# Patient Record
Sex: FEMALE | Race: WHITE | Employment: OTHER | URBAN - METROPOLITAN AREA
[De-identification: names, ages, dates, MRNs, and addresses within clinical notes are randomized per-mention and may not be internally consistent; named-entity substitution may affect disease eponyms.]

---

## 2021-03-04 ENCOUNTER — APPOINTMENT (OUTPATIENT)
Dept: CT IMAGING | Age: 71
End: 2021-03-04
Attending: EMERGENCY MEDICINE
Payer: MEDICARE

## 2021-03-04 ENCOUNTER — HOSPITAL ENCOUNTER (EMERGENCY)
Age: 71
Discharge: HOME | End: 2021-03-05
Attending: EMERGENCY MEDICINE
Payer: MEDICARE

## 2021-03-04 ENCOUNTER — APPOINTMENT (OUTPATIENT)
Dept: GENERAL RADIOLOGY | Age: 71
End: 2021-03-04
Attending: EMERGENCY MEDICINE
Payer: MEDICARE

## 2021-03-04 DIAGNOSIS — S42.292A HUMERAL HEAD FRACTURE, LEFT, CLOSED, INITIAL ENCOUNTER: Primary | ICD-10-CM

## 2021-03-04 PROCEDURE — 74011250636 HC RX REV CODE- 250/636: Performed by: EMERGENCY MEDICINE

## 2021-03-04 PROCEDURE — 99284 EMERGENCY DEPT VISIT MOD MDM: CPT

## 2021-03-04 PROCEDURE — 73090 X-RAY EXAM OF FOREARM: CPT

## 2021-03-04 PROCEDURE — 74011250637 HC RX REV CODE- 250/637: Performed by: EMERGENCY MEDICINE

## 2021-03-04 PROCEDURE — 96372 THER/PROPH/DIAG INJ SC/IM: CPT

## 2021-03-04 PROCEDURE — 73060 X-RAY EXAM OF HUMERUS: CPT

## 2021-03-04 PROCEDURE — 75810000053 HC SPLINT APPLICATION

## 2021-03-04 PROCEDURE — 73200 CT UPPER EXTREMITY W/O DYE: CPT

## 2021-03-04 RX ORDER — KETOROLAC TROMETHAMINE 15 MG/ML
15 INJECTION, SOLUTION INTRAMUSCULAR; INTRAVENOUS ONCE
Status: COMPLETED | OUTPATIENT
Start: 2021-03-04 | End: 2021-03-04

## 2021-03-04 RX ORDER — HYDROCODONE BITARTRATE AND ACETAMINOPHEN 5; 325 MG/1; MG/1
2 TABLET ORAL
Status: COMPLETED | OUTPATIENT
Start: 2021-03-04 | End: 2021-03-04

## 2021-03-04 RX ORDER — ONDANSETRON 4 MG/1
4 TABLET, ORALLY DISINTEGRATING ORAL
Status: COMPLETED | OUTPATIENT
Start: 2021-03-04 | End: 2021-03-04

## 2021-03-04 RX ORDER — MORPHINE SULFATE 4 MG/ML
4 INJECTION INTRAVENOUS ONCE
Status: COMPLETED | OUTPATIENT
Start: 2021-03-04 | End: 2021-03-04

## 2021-03-04 RX ADMIN — KETOROLAC TROMETHAMINE 15 MG: 15 INJECTION, SOLUTION INTRAMUSCULAR; INTRAVENOUS at 23:30

## 2021-03-04 RX ADMIN — ONDANSETRON 4 MG: 4 TABLET, ORALLY DISINTEGRATING ORAL at 21:28

## 2021-03-04 RX ADMIN — HYDROCODONE BITARTRATE AND ACETAMINOPHEN 2 TABLET: 5; 325 TABLET ORAL at 22:50

## 2021-03-04 RX ADMIN — MORPHINE SULFATE 4 MG: 4 INJECTION, SOLUTION INTRAMUSCULAR; INTRAVENOUS at 21:29

## 2021-03-05 VITALS
HEART RATE: 71 BPM | WEIGHT: 171 LBS | TEMPERATURE: 97.9 F | SYSTOLIC BLOOD PRESSURE: 157 MMHG | HEIGHT: 62 IN | OXYGEN SATURATION: 97 % | RESPIRATION RATE: 18 BRPM | BODY MASS INDEX: 31.47 KG/M2 | DIASTOLIC BLOOD PRESSURE: 53 MMHG

## 2021-03-05 PROCEDURE — 74011250637 HC RX REV CODE- 250/637: Performed by: EMERGENCY MEDICINE

## 2021-03-05 RX ORDER — NAPROXEN 500 MG/1
500 TABLET ORAL 2 TIMES DAILY WITH MEALS
Qty: 20 TAB | Refills: 0 | Status: SHIPPED | OUTPATIENT
Start: 2021-03-05 | End: 2021-03-16

## 2021-03-05 RX ORDER — HYDROCODONE BITARTRATE AND ACETAMINOPHEN 5; 325 MG/1; MG/1
1 TABLET ORAL
Qty: 20 TAB | Refills: 0 | Status: SHIPPED | OUTPATIENT
Start: 2021-03-05 | End: 2021-03-11

## 2021-03-05 RX ORDER — OXYCODONE HYDROCHLORIDE 5 MG/1
5 TABLET ORAL
Status: COMPLETED | OUTPATIENT
Start: 2021-03-05 | End: 2021-03-05

## 2021-03-05 RX ADMIN — OXYCODONE HYDROCHLORIDE 5 MG: 5 TABLET ORAL at 00:38

## 2021-03-05 NOTE — ED PROVIDER NOTES
EMERGENCY DEPARTMENT HISTORY AND PHYSICAL EXAM      Date: 3/4/2021  Patient Name: Isla Brunner    History of Presenting Illness     Chief Complaint   Patient presents with   [facility] Fall    Arm Pain       History Provided By: Patient    HPI: Isla Brunner, 65 y.o. female with a past medical history significant For diabetes, high cholesterol, GERD presents to the ED with cc of left shoulder pain after fall at home. Patient visiting family from [place]. And she accidentally stepped on a flashlight on the floor slipped and fell onto her elbow on the left. She has since been having intense pain in the left arm. No numbness no tingling. No head injury no loss of consciousness. Denies any presyncopal symptoms. There are no other complaints, changes, or physical findings at this time. PCP: Mariama Marquez MD    No current facility-administered medications on file prior to encounter. No current outpatient medications on file prior to encounter. Past History     Past Medical History:  Past Medical History:   Diagnosis Date    Diabetes ([address]     Gastric ulcer     High cholesterol        Past Surgical History:  No past surgical history on file. Family History:  No family history on file. Social History:  Social History     Tobacco Use    Smoking status: Never Smoker    Smokeless tobacco: Never Used   Substance Use Topics    Alcohol use: Not Currently    Drug use: Never       Allergies: Allergies   Allergen Reactions    Stings [Sting, Bee] Anaphylaxis    Adhesive Rash         Review of Systems     Review of Systems   Constitutional: Positive for activity change. Negative for appetite change and fatigue. Respiratory: Negative for chest tightness and shortness of breath. Cardiovascular: Negative for chest pain, palpitations and leg swelling. Gastrointestinal: Negative for abdominal pain, nausea and vomiting.    Musculoskeletal: Positive for arthralgias, joint swelling and myalgias. Negative for neck pain and neck stiffness. Skin: Negative for color change and wound. Neurological: Negative for dizziness, weakness, light-headedness and numbness. Psychiatric/Behavioral: Negative for self-injury. Physical Exam      Physical Exam  Vitals signs and nursing note reviewed. Constitutional:       General: She is not in acute distress. Appearance: Normal appearance. She is not ill-appearing. HENT:      Head: Normocephalic and atraumatic. Nose: Nose normal.      Mouth/Throat:      Mouth: Mucous membranes are moist.   Eyes:      Pupils: Pupils are equal, round, and reactive to light. Neck:      Musculoskeletal: Normal range of motion and neck supple. Cardiovascular:      Rate and Rhythm: Normal rate and regular rhythm. Pulmonary:      Effort: Pulmonary effort is normal.      Breath sounds: Normal breath sounds. Abdominal:      General: Abdomen is flat. Bowel sounds are normal.      Palpations: Abdomen is soft. Musculoskeletal:      Left shoulder: She exhibits decreased range of motion, tenderness, bony tenderness, swelling and pain. She exhibits no laceration, no spasm and normal pulse. Skin:     General: Skin is warm and dry. Capillary Refill: Capillary refill takes less than 2 seconds. Neurological:      General: No focal deficit present. Mental Status: She is alert. Psychiatric:         Mood and Affect: Mood normal.         Lab and Diagnostic Study Results     Labs -   No results found for this or any previous visit (from the past 12 hour(s)). Radiologic Studies -   @lastxrresult@  CT Results  (Last 48 hours)               03/04/21 2200  CT SHOULDER LT WO CONT Final result    Impression:  Comminuted fracture involving proximal left humerus. Extension of   fracture planes to include articular margin of the humeral head. Anterior   displacement of humeral shaft in relation to humeral head by up to 1.2 cm.        Orthopedic consultation recommended. Narrative:  CT left shoulder. Axial images are reviewed along with reformatted sagittal/coronal/3-D MIP   images. No IV contrast administered. Dose reduction: All CT scans at this facility are performed using dose reduction   optimization techniques as appropriate to a performed exam including the   following-   automated exposure control, adjustments of mA and/or Kv according to patient   size, or use of iterative reconstructive technique. Comminuted impacted fracture proximal left humerus. Fracture planes extend to the articular humeral head. Articular humeral head appears apposed to the glenoid fossa but with joint space   widening more so along posterior aspect of the joint. Humeral shaft lies anterior to humeral head by approximately 1.2 cm. Scapula and imaged clavicle intact. AC joint intact. CXR Results  (Last 48 hours)    None            Medical Decision Making   - I am the first provider for this patient. - I reviewed the vital signs, available nursing notes, past medical history, past surgical history, family history and social history. - Initial assessment performed. The patients presenting problems have been discussed, and they are in agreement with the care plan formulated and outlined with them. I have encouraged them to ask questions as they arise throughout their visit. Vital Signs-Reviewed the patient's vital signs. Patient Vitals for the past 12 hrs:   Temp Pulse Resp BP SpO2   03/04/21 2220  63 18 (!) 173/70 96 %   03/04/21 2001 97.9 °F (36.6 °C) 62 18 (!) 197/72 98 %       Records Reviewed: Nursing Notes    ED Course:          Provider Notes (Medical Decision Making):   Patient is a 49-year-old female presented to the emergency department with left shoulder pain. She has a comminuted and impacted left surgical neck fracture of the humerus. Sensation intact. Neurovascularly intact.  strength intact.   Range of motion of the elbow wrist are intact. No other injury no head injury. Initially pain control has been an issue. She has responded acceptably to hydrocodone acetaminophen. Discussed with orthopedics Dr. Marcella Merrill recommends sling and follow-up in the office in 1 week. Recommends CT prior to discharge  MDM       Procedures   Medical Decision Makingedical Decision Making  Performed by: Wilma Denny MD  PROCEDURES:    Splint, Long Arm    Date/Time: 3/5/2021 12:33 AM  Performed by: Gali Loya MD  Authorized by: Gali Loya MD     Consent:     Consent obtained:  Verbal    Consent given by:  Patient    Risks discussed:  Discoloration, numbness, pain and swelling    Alternatives discussed:  No treatment and alternative treatment  Pre-procedure details:     Sensation:  Normal  Procedure details:     Laterality:  Left    Location:  Shoulder    Shoulder:  L shoulder    Splint type: coaptation. Supplies:  Cotton padding, sling and Ortho-Glass  Post-procedure details:     Pain:  Unchanged    Sensation:  Normal    Patient tolerance of procedure: Tolerated well, no immediate complications       - Orthopedic Surgery Consultant: Dr. Marcella Merrill: We have asked for emergent assistance with regard to this patient. We have discussed the patients HPI, ROS, PE and labd and imaging results this far. Recommend splint, follow up in office in one week. Disposition   Disposition: DC- Adult Discharges: All of the diagnostic tests were reviewed and questions answered. Diagnosis, care plan and treatment options were discussed. The patient understands the instructions and will follow up as directed. The patients results have been reviewed with them. They have been counseled regarding their diagnosis.   The patient and family member patient verbally convey understanding and agreement of the signs, symptoms, diagnosis, treatment and prognosis and additionally agrees to follow up as recommended with their PCP in 24 - 50 hours.  They also agree with the care-plan and convey that all of their questions have been answered. I have also put together some discharge instructions for them that include: 1) educational information regarding their diagnosis, 2) how to care for their diagnosis at home, as well a 3) list of reasons why they would want to return to the ED prior to their follow-up appointment, should their condition change. Discharged    DISCHARGE PLAN:  1. Current Discharge Medication List      START taking these medications    Details   HYDROcodone-acetaminophen (Norco) 5-325 mg per tablet Take 1 Tab by mouth every six (6) hours as needed for Pain for up to 5 days. Max Daily Amount: 4 Tabs. Qty: 20 Tab, Refills: 0    Associated Diagnoses: Humeral head fracture, left, closed, initial encounter      naproxen (Naprosyn) 500 mg tablet Take 1 Tab by mouth two (2) times daily (with meals) for 10 days. Qty: 20 Tab, Refills: 0           2. Follow-up Information     Follow up With Specialties Details Why Contact Info    Pepe Lundberg MD Orthopedic Surgery Schedule an appointment as soon as possible for a visit in 1 week For followup and recheck of todays symptoms [address] [place] [address] [address] [facility]      Paola Jama MD Orthopedic Surgery, Sports Medicine Call in 1 week For followup and recheck of todays symptoms [facility]  [facility] [address] [place] [id number] [phone]      [facility] EMERGENCY DEPT Emergency Medicine Go to  As needed, or for any concerns or deteriorations. , if symptoms persist or worsen. [address] [facility] [address] [phone]        3. Return to ED if worse   4. Current Discharge Medication List      START taking these medications    Details   HYDROcodone-acetaminophen (Norco) 5-325 mg per tablet Take 1 Tab by mouth every six (6) hours as needed for Pain for up to 5 days. Max Daily Amount: 4 Tabs.   Qty: 20 Tab, Refills: 0    Associated Diagnoses: Humeral head fracture, left, closed, initial encounter      naproxen (Naprosyn) 500 mg tablet Take 1 Tab by mouth two (2) times daily (with meals) for 10 days. Qty: 20 Tab, Refills: 0               Diagnosis     Clinical Impression:   1. Humeral head fracture, left, closed, initial encounter        Attestations:    Macarena Hay MD    Please note that this dictation was completed with [employer], the computer voice recognition software. Quite often unanticipated grammatical, syntax, homophones, and other interpretive errors are inadvertently transcribed by the computer software. Please disregard these errors. Please excuse any errors that have escaped final proofreading. Thank you.

## 2021-03-11 ENCOUNTER — HOSPITAL ENCOUNTER (OUTPATIENT)
Dept: PREADMISSION TESTING | Age: 71
Discharge: HOME | End: 2021-03-11
Payer: MEDICARE

## 2021-03-11 VITALS
HEIGHT: 62 IN | TEMPERATURE: 97.8 F | HEART RATE: 57 BPM | DIASTOLIC BLOOD PRESSURE: 68 MMHG | SYSTOLIC BLOOD PRESSURE: 139 MMHG | OXYGEN SATURATION: 98 % | WEIGHT: 179.23 LBS | RESPIRATION RATE: 16 BRPM | BODY MASS INDEX: 32.98 KG/M2

## 2021-03-11 LAB
ABO + RH BLD: NORMAL
ANION GAP SERPL CALC-SCNC: 2 MMOL/L (ref 5–15)
BLOOD GROUP ANTIBODIES SERPL: NEGATIVE
BUN SERPL-MCNC: 16 MG/DL (ref 6–20)
BUN/CREAT SERPL: 23 (ref 12–20)
CA-I BLD-MCNC: 9.7 MG/DL (ref 8.5–10.1)
CHLORIDE SERPL-SCNC: 105 MMOL/L (ref 97–108)
CO2 SERPL-SCNC: 32 MMOL/L (ref 21–32)
CREAT SERPL-MCNC: 0.71 MG/DL (ref 0.55–1.02)
ERYTHROCYTE [DISTWIDTH] IN BLOOD BY AUTOMATED COUNT: 20.6 % (ref 11.5–14.5)
GLUCOSE SERPL-MCNC: 114 MG/DL (ref 65–100)
HCT VFR BLD AUTO: 30.2 % (ref 35–47)
HGB BLD-MCNC: 9 G/DL (ref 11.5–16)
MCH RBC QN AUTO: 23.4 PG (ref 26–34)
MCHC RBC AUTO-ENTMCNC: 29.8 G/DL (ref 30–36.5)
MCV RBC AUTO: 78.4 FL (ref 80–99)
MRSA DNA SPEC QL NAA+PROBE: NOT DETECTED
NRBC # BLD: 0 K/UL (ref 0–0.01)
NRBC BLD-RTO: 0 PER 100 WBC
PLATELET # BLD AUTO: 276 K/UL (ref 150–400)
PMV BLD AUTO: 9.9 FL (ref 8.9–12.9)
POTASSIUM SERPL-SCNC: 4.6 MMOL/L (ref 3.5–5.1)
RBC # BLD AUTO: 3.85 M/UL (ref 3.8–5.2)
SARS-COV-2, COV2: NORMAL
SODIUM SERPL-SCNC: 139 MMOL/L (ref 136–145)
SPECIMEN EXP DATE BLD: NORMAL
WBC # BLD AUTO: 5.3 K/UL (ref 3.6–11)

## 2021-03-11 PROCEDURE — 85027 COMPLETE CBC AUTOMATED: CPT

## 2021-03-11 PROCEDURE — 87641 MR-STAPH DNA AMP PROBE: CPT

## 2021-03-11 PROCEDURE — 36415 COLL VENOUS BLD VENIPUNCTURE: CPT

## 2021-03-11 PROCEDURE — 80048 BASIC METABOLIC PNL TOTAL CA: CPT

## 2021-03-11 PROCEDURE — 86850 RBC ANTIBODY SCREEN: CPT

## 2021-03-11 PROCEDURE — U0003 INFECTIOUS AGENT DETECTION BY NUCLEIC ACID (DNA OR RNA); SEVERE ACUTE RESPIRATORY SYNDROME CORONAVIRUS 2 (SARS-COV-2) (CORONAVIRUS DISEASE [COVID-19]), AMPLIFIED PROBE TECHNIQUE, MAKING USE OF HIGH THROUGHPUT TECHNOLOGIES AS DESCRIBED BY CMS-2020-01-R: HCPCS

## 2021-03-11 RX ORDER — BUSPIRONE HYDROCHLORIDE 15 MG/1
15 TABLET ORAL DAILY
COMMUNITY

## 2021-03-11 RX ORDER — ARIPIPRAZOLE 20 MG/1
20 TABLET ORAL DAILY
COMMUNITY

## 2021-03-11 RX ORDER — PIOGLITAZONEHYDROCHLORIDE 45 MG/1
45 TABLET ORAL DAILY
COMMUNITY

## 2021-03-11 RX ORDER — PANTOPRAZOLE SODIUM 40 MG/1
40 TABLET, DELAYED RELEASE ORAL DAILY
COMMUNITY

## 2021-03-11 RX ORDER — CITALOPRAM 20 MG/1
20 TABLET, FILM COATED ORAL DAILY
COMMUNITY

## 2021-03-11 RX ORDER — ATORVASTATIN CALCIUM 40 MG/1
40 TABLET, FILM COATED ORAL DAILY
COMMUNITY

## 2021-03-11 RX ORDER — GLIMEPIRIDE 1 MG/1
1 TABLET ORAL
COMMUNITY

## 2021-03-11 RX ORDER — LEVOTHYROXINE SODIUM 125 UG/1
125 TABLET ORAL
COMMUNITY

## 2021-03-11 RX ORDER — LISINOPRIL 5 MG/1
5 TABLET ORAL DAILY
COMMUNITY

## 2021-03-11 NOTE — PERIOP NOTES
1200 Dr. Camargo's office called, left a message for Katy Aguilera, surgical scheduler, requested to verify patient status and pre testing orders for surgery scheduled for 3/15/2021 and to return call.   1225 [facility] called, with permission given by patient during PAT visit, requested most recent ekg, chest xray and ER discharge summary, pt stated she was in ER at [facility] Jan 2021 for pain in her side.Medical records dept requested for request to be made by fax.  1230 Request faxed with confirmation received.   1420 Katy called from Dr. Camargo's office, order given for status to be 23 hour outpatient, Katy made aware ekg and cxr results obtained from [facility] as noted above, Katy confirmed preop screening orders are complete. Katy made aware patient stated during PAT visit that she takes naproxen 500mg for pain, requested for Katy to make Dr. Camargo aware and call patient with any instructions. Patient stated during PAT visit no hx for herself of DVT's and no family hx of DVT's.

## 2021-03-11 NOTE — PERIOP NOTES
80 Dr. Maria Ines Esquivel called, made aware of patient's history, made aware of ekg, cxr and discharge summary from [facility] done Jan 8, 2021. No new orders given, stated ok to proceed with surgery as planned.

## 2021-03-11 NOTE — PERIOP NOTES
65 Dr. Nova Graf office called, spoke with Paulina Guevara, surgical scheduler, made aware of CBC, hgb 9.0 and hct 30.2. Katy to make Dr. Len Farmer aware.

## 2021-03-15 ENCOUNTER — ANESTHESIA EVENT (OUTPATIENT)
Dept: SURGERY | Age: 71
End: 2021-03-15
Payer: MEDICARE

## 2021-03-15 ENCOUNTER — APPOINTMENT (OUTPATIENT)
Dept: GENERAL RADIOLOGY | Age: 71
End: 2021-03-15
Attending: ORTHOPAEDIC SURGERY
Payer: MEDICARE

## 2021-03-15 ENCOUNTER — HOSPITAL ENCOUNTER (OUTPATIENT)
Age: 71
Discharge: HOME | End: 2021-03-16
Attending: ORTHOPAEDIC SURGERY | Admitting: ORTHOPAEDIC SURGERY
Payer: MEDICARE

## 2021-03-15 ENCOUNTER — ANESTHESIA (OUTPATIENT)
Dept: SURGERY | Age: 71
End: 2021-03-15
Payer: MEDICARE

## 2021-03-15 DIAGNOSIS — S42.202A CLOSED FRACTURE OF PROXIMAL END OF LEFT HUMERUS, UNSPECIFIED FRACTURE MORPHOLOGY, INITIAL ENCOUNTER: Primary | ICD-10-CM

## 2021-03-15 LAB
GLUCOSE BLD STRIP.AUTO-MCNC: 127 MG/DL (ref 65–100)
GLUCOSE BLD STRIP.AUTO-MCNC: 219 MG/DL (ref 65–100)
GLUCOSE BLD STRIP.AUTO-MCNC: 399 MG/DL (ref 65–100)
PERFORMED BY, TECHID: ABNORMAL

## 2021-03-15 PROCEDURE — 77030008462 HC STPLR SKN PROX J&J -A: Performed by: ORTHOPAEDIC SURGERY

## 2021-03-15 PROCEDURE — 77030040503 HC DRN WND PENRS MDII -A: Performed by: ORTHOPAEDIC SURGERY

## 2021-03-15 PROCEDURE — 77030033067 HC SUT PDO STRATFX SPIR J&J -B: Performed by: ORTHOPAEDIC SURGERY

## 2021-03-15 PROCEDURE — 77030040875 HC CAUT BATTRY MDII -A: Performed by: ORTHOPAEDIC SURGERY

## 2021-03-15 PROCEDURE — 77030003666 HC NDL SPINAL BD -A: Performed by: ORTHOPAEDIC SURGERY

## 2021-03-15 PROCEDURE — 74011250636 HC RX REV CODE- 250/636: Performed by: ORTHOPAEDIC SURGERY

## 2021-03-15 PROCEDURE — 74011636637 HC RX REV CODE- 636/637: Performed by: NURSE PRACTITIONER

## 2021-03-15 PROCEDURE — 77030002933 HC SUT MCRYL J&J -A: Performed by: ORTHOPAEDIC SURGERY

## 2021-03-15 PROCEDURE — 74011000250 HC RX REV CODE- 250: Performed by: ORTHOPAEDIC SURGERY

## 2021-03-15 PROCEDURE — 77030008463 HC STPLR SKN PROX J&J -B: Performed by: ORTHOPAEDIC SURGERY

## 2021-03-15 PROCEDURE — 74011250636 HC RX REV CODE- 250/636: Performed by: ANESTHESIOLOGY

## 2021-03-15 PROCEDURE — 77030002922 HC SUT FBRWRE ARTH -B: Performed by: ORTHOPAEDIC SURGERY

## 2021-03-15 PROCEDURE — 77030018074 HC RTVR SUT ARTH4 S&N -B: Performed by: ORTHOPAEDIC SURGERY

## 2021-03-15 PROCEDURE — 74011250636 HC RX REV CODE- 250/636: Performed by: NURSE ANESTHETIST, CERTIFIED REGISTERED

## 2021-03-15 PROCEDURE — 77030029372 HC ADH SKN CLSR PRINEO J&J -C: Performed by: ORTHOPAEDIC SURGERY

## 2021-03-15 PROCEDURE — 77030012547: Performed by: ORTHOPAEDIC SURGERY

## 2021-03-15 PROCEDURE — 2709999900 HC NON-CHARGEABLE SUPPLY: Performed by: ORTHOPAEDIC SURGERY

## 2021-03-15 PROCEDURE — 76060000037 HC ANESTHESIA 3 TO 3.5 HR: Performed by: ORTHOPAEDIC SURGERY

## 2021-03-15 PROCEDURE — 74011000250 HC RX REV CODE- 250: Performed by: NURSE ANESTHETIST, CERTIFIED REGISTERED

## 2021-03-15 PROCEDURE — C1776 JOINT DEVICE (IMPLANTABLE): HCPCS | Performed by: ORTHOPAEDIC SURGERY

## 2021-03-15 PROCEDURE — 77030031138 HC SUT VCRL1 J&J -A: Performed by: ORTHOPAEDIC SURGERY

## 2021-03-15 PROCEDURE — 74011000258 HC RX REV CODE- 258: Performed by: ORTHOPAEDIC SURGERY

## 2021-03-15 PROCEDURE — 77030003827 HC BIT DRL J&J -B: Performed by: ORTHOPAEDIC SURGERY

## 2021-03-15 PROCEDURE — C1713 ANCHOR/SCREW BN/BN,TIS/BN: HCPCS | Performed by: ORTHOPAEDIC SURGERY

## 2021-03-15 PROCEDURE — 77030041010: Performed by: ORTHOPAEDIC SURGERY

## 2021-03-15 PROCEDURE — 77030018673: Performed by: ORTHOPAEDIC SURGERY

## 2021-03-15 PROCEDURE — 76210000016 HC OR PH I REC 1 TO 1.5 HR: Performed by: ORTHOPAEDIC SURGERY

## 2021-03-15 PROCEDURE — 77030031139 HC SUT VCRL2 J&J -A: Performed by: ORTHOPAEDIC SURGERY

## 2021-03-15 PROCEDURE — 77030039398 HC MIX CEM PRSM J&J -C: Performed by: ORTHOPAEDIC SURGERY

## 2021-03-15 PROCEDURE — 77030018547 HC SUT ETHBND1 J&J -B: Performed by: ORTHOPAEDIC SURGERY

## 2021-03-15 PROCEDURE — 36415 COLL VENOUS BLD VENIPUNCTURE: CPT

## 2021-03-15 PROCEDURE — 77030013190: Performed by: ORTHOPAEDIC SURGERY

## 2021-03-15 PROCEDURE — 77030010785: Performed by: ORTHOPAEDIC SURGERY

## 2021-03-15 PROCEDURE — 74011250637 HC RX REV CODE- 250/637: Performed by: ORTHOPAEDIC SURGERY

## 2021-03-15 PROCEDURE — 77030028234 HC DEV PEAK PLSMBLD MEDT -B: Performed by: ORTHOPAEDIC SURGERY

## 2021-03-15 PROCEDURE — 73030 X-RAY EXAM OF SHOULDER: CPT

## 2021-03-15 PROCEDURE — 76010000173 HC OR TIME 3 TO 3.5 HR INTENSV-TIER 1: Performed by: ORTHOPAEDIC SURGERY

## 2021-03-15 PROCEDURE — 82962 GLUCOSE BLOOD TEST: CPT

## 2021-03-15 DEVICE — SCREW BNE L18MM DIA4.5MM SHLDR TI NONLOCKING FULL THRD FOR: Type: IMPLANTABLE DEVICE | Site: SHOULDER | Status: FUNCTIONAL

## 2021-03-15 DEVICE — CUP HUM DIA38MM +3MM OFFSET STD SHLDR POLYETH DELT XTEND: Type: IMPLANTABLE DEVICE | Site: SHOULDER | Status: FUNCTIONAL

## 2021-03-15 DEVICE — SCREW BNE L30MM DIA4.5MM GLEN SHLDR METAGLENE LOK FOR DELT: Type: IMPLANTABLE DEVICE | Site: SHOULDER | Status: FUNCTIONAL

## 2021-03-15 DEVICE — IMPLANTABLE DEVICE: Type: IMPLANTABLE DEVICE | Site: SHOULDER | Status: FUNCTIONAL

## 2021-03-15 DEVICE — SCREW BNE L24MM DIA4.5MM GLEN SHLDR METAGLENE LOK FOR DELT: Type: IMPLANTABLE DEVICE | Site: SHOULDER | Status: FUNCTIONAL

## 2021-03-15 DEVICE — STEM HUM L121MM DIA12MM STD SHLDR PORCOAT FOR PLATFRM: Type: IMPLANTABLE DEVICE | Site: SHOULDER | Status: FUNCTIONAL

## 2021-03-15 DEVICE — SCREW BNE L36MM DIA4.5MM GLEN SHLDR METAGLENE LOK FOR DELT: Type: IMPLANTABLE DEVICE | Site: SHOULDER | Status: FUNCTIONAL

## 2021-03-15 DEVICE — SHOULDER S3 TOT ADV REVERSE IMPL CAPPED S3: Type: IMPLANTABLE DEVICE | Site: SHOULDER | Status: FUNCTIONAL

## 2021-03-15 DEVICE — CEMENT BNE 20ML 40GM FULL DOSE PMMA W/O ANTIBIO M VISC: Type: IMPLANTABLE DEVICE | Site: SHOULDER | Status: FUNCTIONAL

## 2021-03-15 DEVICE — SPACER HUM +9MM OFFSET SHLDR POLYETH FOR DELT XTEND REV SYS: Type: IMPLANTABLE DEVICE | Site: SHOULDER | Status: FUNCTIONAL

## 2021-03-15 DEVICE — GLOBAL UNITE PLATFORM SHOULDER SYSTEM REVERSE FX EPI CENTER POROCOAT
Type: IMPLANTABLE DEVICE | Site: SHOULDER | Status: FUNCTIONAL
Brand: GLOBAL UNITE

## 2021-03-15 RX ORDER — MELATONIN
1000 DAILY
Status: DISCONTINUED | OUTPATIENT
Start: 2021-03-16 | End: 2021-03-16 | Stop reason: HOSPADM

## 2021-03-15 RX ORDER — ROPIVACAINE HYDROCHLORIDE 5 MG/ML
INJECTION, SOLUTION EPIDURAL; INFILTRATION; PERINEURAL
Status: COMPLETED | OUTPATIENT
Start: 2021-03-15 | End: 2021-03-15

## 2021-03-15 RX ORDER — ROPIVACAINE HYDROCHLORIDE 5 MG/ML
INJECTION, SOLUTION EPIDURAL; INFILTRATION; PERINEURAL
Status: COMPLETED
Start: 2021-03-15 | End: 2021-03-15

## 2021-03-15 RX ORDER — DEXAMETHASONE SODIUM PHOSPHATE 4 MG/ML
INJECTION, SOLUTION INTRA-ARTICULAR; INTRALESIONAL; INTRAMUSCULAR; INTRAVENOUS; SOFT TISSUE
Status: COMPLETED | OUTPATIENT
Start: 2021-03-15 | End: 2021-03-15

## 2021-03-15 RX ORDER — ACETAMINOPHEN 325 MG/1
650 TABLET ORAL
Status: DISCONTINUED | OUTPATIENT
Start: 2021-03-15 | End: 2021-03-16

## 2021-03-15 RX ORDER — LISINOPRIL 5 MG/1
5 TABLET ORAL DAILY
Status: DISCONTINUED | OUTPATIENT
Start: 2021-03-16 | End: 2021-03-16 | Stop reason: HOSPADM

## 2021-03-15 RX ORDER — DIPHENHYDRAMINE HYDROCHLORIDE 50 MG/ML
12.5 INJECTION, SOLUTION INTRAMUSCULAR; INTRAVENOUS AS NEEDED
Status: DISCONTINUED | OUTPATIENT
Start: 2021-03-15 | End: 2021-03-15 | Stop reason: HOSPADM

## 2021-03-15 RX ORDER — SODIUM CHLORIDE 0.9 % (FLUSH) 0.9 %
5-40 SYRINGE (ML) INJECTION AS NEEDED
Status: DISCONTINUED | OUTPATIENT
Start: 2021-03-15 | End: 2021-03-15 | Stop reason: HOSPADM

## 2021-03-15 RX ORDER — ROCURONIUM BROMIDE 10 MG/ML
INJECTION, SOLUTION INTRAVENOUS AS NEEDED
Status: DISCONTINUED | OUTPATIENT
Start: 2021-03-15 | End: 2021-03-15 | Stop reason: HOSPADM

## 2021-03-15 RX ORDER — INSULIN GLARGINE 100 [IU]/ML
7 INJECTION, SOLUTION SUBCUTANEOUS
Status: DISCONTINUED | OUTPATIENT
Start: 2021-03-15 | End: 2021-03-16 | Stop reason: HOSPADM

## 2021-03-15 RX ORDER — FENTANYL CITRATE 50 UG/ML
INJECTION, SOLUTION INTRAMUSCULAR; INTRAVENOUS
Status: COMPLETED | OUTPATIENT
Start: 2021-03-15 | End: 2021-03-15

## 2021-03-15 RX ORDER — MIDAZOLAM HYDROCHLORIDE 1 MG/ML
INJECTION, SOLUTION INTRAMUSCULAR; INTRAVENOUS AS NEEDED
Status: DISCONTINUED | OUTPATIENT
Start: 2021-03-15 | End: 2021-03-15 | Stop reason: HOSPADM

## 2021-03-15 RX ORDER — NALOXONE HYDROCHLORIDE 0.4 MG/ML
0.4 INJECTION, SOLUTION INTRAMUSCULAR; INTRAVENOUS; SUBCUTANEOUS AS NEEDED
Status: DISCONTINUED | OUTPATIENT
Start: 2021-03-15 | End: 2021-03-16 | Stop reason: HOSPADM

## 2021-03-15 RX ORDER — PROPOFOL 10 MG/ML
INJECTION, EMULSION INTRAVENOUS AS NEEDED
Status: DISCONTINUED | OUTPATIENT
Start: 2021-03-15 | End: 2021-03-15 | Stop reason: HOSPADM

## 2021-03-15 RX ORDER — ARIPIPRAZOLE 5 MG/1
20 TABLET ORAL DAILY
Status: DISCONTINUED | OUTPATIENT
Start: 2021-03-16 | End: 2021-03-16 | Stop reason: HOSPADM

## 2021-03-15 RX ORDER — AMOXICILLIN 250 MG
1 CAPSULE ORAL 2 TIMES DAILY
Status: DISCONTINUED | OUTPATIENT
Start: 2021-03-15 | End: 2021-03-16 | Stop reason: HOSPADM

## 2021-03-15 RX ORDER — ASPIRIN 325 MG
325 TABLET, DELAYED RELEASE (ENTERIC COATED) ORAL 2 TIMES DAILY
Status: DISCONTINUED | OUTPATIENT
Start: 2021-03-15 | End: 2021-03-16 | Stop reason: HOSPADM

## 2021-03-15 RX ORDER — SODIUM CHLORIDE 450 MG/100ML
75 INJECTION, SOLUTION INTRAVENOUS CONTINUOUS
Status: DISCONTINUED | OUTPATIENT
Start: 2021-03-15 | End: 2021-03-16 | Stop reason: HOSPADM

## 2021-03-15 RX ORDER — BUSPIRONE HYDROCHLORIDE 10 MG/1
15 TABLET ORAL DAILY
Status: DISCONTINUED | OUTPATIENT
Start: 2021-03-16 | End: 2021-03-16 | Stop reason: HOSPADM

## 2021-03-15 RX ORDER — SODIUM CHLORIDE 0.9 % (FLUSH) 0.9 %
5-40 SYRINGE (ML) INJECTION AS NEEDED
Status: DISCONTINUED | OUTPATIENT
Start: 2021-03-15 | End: 2021-03-16 | Stop reason: HOSPADM

## 2021-03-15 RX ORDER — SODIUM CHLORIDE, SODIUM LACTATE, POTASSIUM CHLORIDE, CALCIUM CHLORIDE 600; 310; 30; 20 MG/100ML; MG/100ML; MG/100ML; MG/100ML
20 INJECTION, SOLUTION INTRAVENOUS CONTINUOUS
Status: DISCONTINUED | OUTPATIENT
Start: 2021-03-15 | End: 2021-03-15

## 2021-03-15 RX ORDER — HYDROMORPHONE HYDROCHLORIDE 1 MG/ML
0.5 INJECTION, SOLUTION INTRAMUSCULAR; INTRAVENOUS; SUBCUTANEOUS
Status: DISCONTINUED | OUTPATIENT
Start: 2021-03-15 | End: 2021-03-15 | Stop reason: HOSPADM

## 2021-03-15 RX ORDER — ONDANSETRON 2 MG/ML
INJECTION INTRAMUSCULAR; INTRAVENOUS AS NEEDED
Status: DISCONTINUED | OUTPATIENT
Start: 2021-03-15 | End: 2021-03-15 | Stop reason: HOSPADM

## 2021-03-15 RX ORDER — SODIUM CHLORIDE 0.9 % (FLUSH) 0.9 %
5-40 SYRINGE (ML) INJECTION EVERY 8 HOURS
Status: DISCONTINUED | OUTPATIENT
Start: 2021-03-15 | End: 2021-03-16 | Stop reason: HOSPADM

## 2021-03-15 RX ORDER — FAMOTIDINE 20 MG/1
20 TABLET, FILM COATED ORAL 2 TIMES DAILY
Status: DISCONTINUED | OUTPATIENT
Start: 2021-03-15 | End: 2021-03-16 | Stop reason: HOSPADM

## 2021-03-15 RX ORDER — FACIAL-BODY WIPES
10 EACH TOPICAL DAILY PRN
Status: DISCONTINUED | OUTPATIENT
Start: 2021-03-17 | End: 2021-03-16 | Stop reason: HOSPADM

## 2021-03-15 RX ORDER — INSULIN LISPRO 100 [IU]/ML
INJECTION, SOLUTION INTRAVENOUS; SUBCUTANEOUS
Status: DISCONTINUED | OUTPATIENT
Start: 2021-03-15 | End: 2021-03-16

## 2021-03-15 RX ORDER — FENTANYL CITRATE 50 UG/ML
50 INJECTION, SOLUTION INTRAMUSCULAR; INTRAVENOUS
Status: DISCONTINUED | OUTPATIENT
Start: 2021-03-15 | End: 2021-03-15 | Stop reason: HOSPADM

## 2021-03-15 RX ORDER — EPHEDRINE SULFATE/0.9% NACL/PF 50 MG/5 ML
SYRINGE (ML) INTRAVENOUS AS NEEDED
Status: DISCONTINUED | OUTPATIENT
Start: 2021-03-15 | End: 2021-03-15 | Stop reason: HOSPADM

## 2021-03-15 RX ORDER — TRAMADOL HYDROCHLORIDE 50 MG/1
50 TABLET ORAL
Status: DISCONTINUED | OUTPATIENT
Start: 2021-03-15 | End: 2021-03-16

## 2021-03-15 RX ORDER — OXYCODONE HYDROCHLORIDE 5 MG/1
5 TABLET ORAL
Status: DISCONTINUED | OUTPATIENT
Start: 2021-03-15 | End: 2021-03-16

## 2021-03-15 RX ORDER — ONDANSETRON 2 MG/ML
4 INJECTION INTRAMUSCULAR; INTRAVENOUS
Status: ACTIVE | OUTPATIENT
Start: 2021-03-15 | End: 2021-03-16

## 2021-03-15 RX ORDER — ONDANSETRON 2 MG/ML
4 INJECTION INTRAMUSCULAR; INTRAVENOUS AS NEEDED
Status: DISCONTINUED | OUTPATIENT
Start: 2021-03-15 | End: 2021-03-15 | Stop reason: HOSPADM

## 2021-03-15 RX ORDER — GLIMEPIRIDE 1 MG/1
1 TABLET ORAL
Status: DISCONTINUED | OUTPATIENT
Start: 2021-03-16 | End: 2021-03-16 | Stop reason: HOSPADM

## 2021-03-15 RX ORDER — CLINDAMYCIN PHOSPHATE 900 MG/50ML
900 INJECTION INTRAVENOUS ONCE
Status: COMPLETED | OUTPATIENT
Start: 2021-03-15 | End: 2021-03-15

## 2021-03-15 RX ORDER — FENTANYL CITRATE 50 UG/ML
INJECTION, SOLUTION INTRAMUSCULAR; INTRAVENOUS AS NEEDED
Status: DISCONTINUED | OUTPATIENT
Start: 2021-03-15 | End: 2021-03-15 | Stop reason: HOSPADM

## 2021-03-15 RX ORDER — FENTANYL CITRATE 50 UG/ML
INJECTION, SOLUTION INTRAMUSCULAR; INTRAVENOUS
Status: COMPLETED
Start: 2021-03-15 | End: 2021-03-15

## 2021-03-15 RX ORDER — CITALOPRAM 20 MG/1
20 TABLET, FILM COATED ORAL DAILY
Status: DISCONTINUED | OUTPATIENT
Start: 2021-03-16 | End: 2021-03-16 | Stop reason: HOSPADM

## 2021-03-15 RX ORDER — SUCCINYLCHOLINE CHLORIDE 20 MG/ML
INJECTION INTRAMUSCULAR; INTRAVENOUS AS NEEDED
Status: DISCONTINUED | OUTPATIENT
Start: 2021-03-15 | End: 2021-03-15 | Stop reason: HOSPADM

## 2021-03-15 RX ORDER — ATORVASTATIN CALCIUM 40 MG/1
40 TABLET, FILM COATED ORAL
Status: DISCONTINUED | OUTPATIENT
Start: 2021-03-15 | End: 2021-03-16 | Stop reason: HOSPADM

## 2021-03-15 RX ORDER — DEXAMETHASONE SODIUM PHOSPHATE 4 MG/ML
INJECTION, SOLUTION INTRA-ARTICULAR; INTRALESIONAL; INTRAMUSCULAR; INTRAVENOUS; SOFT TISSUE AS NEEDED
Status: DISCONTINUED | OUTPATIENT
Start: 2021-03-15 | End: 2021-03-15 | Stop reason: HOSPADM

## 2021-03-15 RX ADMIN — ACETAMINOPHEN 650 MG: 325 TABLET, FILM COATED ORAL at 20:26

## 2021-03-15 RX ADMIN — CEFAZOLIN SODIUM 2 G: 1 INJECTION, POWDER, FOR SOLUTION INTRAMUSCULAR; INTRAVENOUS at 16:11

## 2021-03-15 RX ADMIN — INSULIN GLARGINE 7 UNITS: 100 INJECTION, SOLUTION SUBCUTANEOUS at 21:32

## 2021-03-15 RX ADMIN — SODIUM CHLORIDE 75 ML/HR: 4.5 INJECTION, SOLUTION INTRAVENOUS at 17:13

## 2021-03-15 RX ADMIN — TRANEXAMIC ACID 1 G: 1 INJECTION, SOLUTION INTRAVENOUS at 11:53

## 2021-03-15 RX ADMIN — ATORVASTATIN CALCIUM 40 MG: 40 TABLET, FILM COATED ORAL at 20:19

## 2021-03-15 RX ADMIN — PROPOFOL 50 MG: 10 INJECTION, EMULSION INTRAVENOUS at 13:24

## 2021-03-15 RX ADMIN — DEXAMETHASONE SODIUM PHOSPHATE 4 MG: 4 INJECTION, SOLUTION INTRA-ARTICULAR; INTRALESIONAL; INTRAMUSCULAR; INTRAVENOUS; SOFT TISSUE at 11:41

## 2021-03-15 RX ADMIN — DEXAMETHASONE SODIUM PHOSPHATE 4 MG: 4 INJECTION, SOLUTION INTRA-ARTICULAR; INTRALESIONAL; INTRAMUSCULAR; INTRAVENOUS; SOFT TISSUE at 10:06

## 2021-03-15 RX ADMIN — Medication 10 ML: at 21:34

## 2021-03-15 RX ADMIN — FENTANYL CITRATE 100 MCG: 50 INJECTION, SOLUTION INTRAMUSCULAR; INTRAVENOUS at 11:26

## 2021-03-15 RX ADMIN — CLINDAMYCIN PHOSPHATE 900 MG: 900 INJECTION, SOLUTION INTRAVENOUS at 11:34

## 2021-03-15 RX ADMIN — ASPIRIN 325 MG: 325 TABLET, COATED ORAL at 20:19

## 2021-03-15 RX ADMIN — ROPIVACAINE HYDROCHLORIDE 18 ML: 5 INJECTION, SOLUTION EPIDURAL; INFILTRATION; PERINEURAL at 10:06

## 2021-03-15 RX ADMIN — PROPOFOL 50 MG: 10 INJECTION, EMULSION INTRAVENOUS at 14:15

## 2021-03-15 RX ADMIN — PROPOFOL 100 MG: 10 INJECTION, EMULSION INTRAVENOUS at 11:27

## 2021-03-15 RX ADMIN — ROCURONIUM BROMIDE 20 MG: 10 SOLUTION INTRAVENOUS at 12:33

## 2021-03-15 RX ADMIN — FENTANYL CITRATE 100 MCG: 50 INJECTION, SOLUTION INTRAMUSCULAR; INTRAVENOUS at 10:06

## 2021-03-15 RX ADMIN — SUCCINYLCHOLINE CHLORIDE 100 MG: 20 INJECTION, SOLUTION INTRAMUSCULAR; INTRAVENOUS at 11:28

## 2021-03-15 RX ADMIN — Medication 10 MG: at 11:50

## 2021-03-15 RX ADMIN — DOCUSATE SODIUM 50MG AND SENNOSIDES 8.6MG 1 TABLET: 8.6; 5 TABLET, FILM COATED ORAL at 20:20

## 2021-03-15 RX ADMIN — CEFAZOLIN SODIUM 2 G: 1 INJECTION, POWDER, FOR SOLUTION INTRAMUSCULAR; INTRAVENOUS at 23:56

## 2021-03-15 RX ADMIN — INSULIN LISPRO 6 UNITS: 100 INJECTION, SOLUTION INTRAVENOUS; SUBCUTANEOUS at 21:31

## 2021-03-15 RX ADMIN — PHENYLEPHRINE HYDROCHLORIDE 100 MCG: 10 INJECTION INTRAVENOUS at 12:53

## 2021-03-15 RX ADMIN — OXYCODONE HYDROCHLORIDE 5 MG: 5 TABLET ORAL at 22:32

## 2021-03-15 RX ADMIN — ROCURONIUM BROMIDE 20 MG: 10 SOLUTION INTRAVENOUS at 11:39

## 2021-03-15 RX ADMIN — FAMOTIDINE 20 MG: 20 TABLET ORAL at 20:19

## 2021-03-15 RX ADMIN — TRAMADOL HYDROCHLORIDE 50 MG: 50 TABLET, FILM COATED ORAL at 16:11

## 2021-03-15 RX ADMIN — MIDAZOLAM HYDROCHLORIDE 2 MG: 2 INJECTION, SOLUTION INTRAMUSCULAR; INTRAVENOUS at 11:19

## 2021-03-15 RX ADMIN — ONDANSETRON 4 MG: 2 INJECTION INTRAMUSCULAR; INTRAVENOUS at 11:41

## 2021-03-15 RX ADMIN — SODIUM CHLORIDE, POTASSIUM CHLORIDE, SODIUM LACTATE AND CALCIUM CHLORIDE: 600; 310; 30; 20 INJECTION, SOLUTION INTRAVENOUS at 11:19

## 2021-03-15 NOTE — PROGRESS NOTES
Bedside shift change report given to Andrez Aparicio RN (oncoming nurse) by Samia Curtis RN (offgoing nurse). Report included the following information SBAR.

## 2021-03-15 NOTE — ANESTHESIA POSTPROCEDURE EVALUATION
Procedure(s):  LEFT REVERSE SHOULDER ARTHROPLASTY (URGENT) (BLOCK).     general, regional    Anesthesia Post Evaluation      Multimodal analgesia: multimodal analgesia not used between 6 hours prior to anesthesia start to PACU discharge  Patient location during evaluation: PACU  Patient participation: complete - patient participated  Level of consciousness: awake and alert  Pain score: 2  Pain management: adequate  Airway patency: patent  Anesthetic complications: no  Cardiovascular status: acceptable, blood pressure returned to baseline and hemodynamically stable  Respiratory status: acceptable, nonlabored ventilation, spontaneous ventilation, unassisted and nasal cannula  Hydration status: acceptable  Post anesthesia nausea and vomiting:  none  Final Post Anesthesia Temperature Assessment:  Normothermia (36.0-37.5 degrees C)      INITIAL Post-op Vital signs:   Vitals Value Taken Time   /54 03/15/21 1505   Temp 37.3 °C (99.1 °F) 03/15/21 1436   Pulse 66 03/15/21 1505   Resp 20 03/15/21 1505   SpO2 99 % 03/15/21 1505

## 2021-03-15 NOTE — ANESTHESIA PROCEDURE NOTES
Peripheral Block    Start time: 3/15/2021 9:58 AM  End time: 3/15/2021 10:06 AM  Performed by: Nellie Gay CRNA  Authorized by: Getachew Marquez MD       Pre-procedure: Indications: at surgeon's request, post-op pain management and procedure for pain    Preanesthetic Checklist: patient identified, risks and benefits discussed, site marked, timeout performed, anesthesia consent given and patient being monitored    Timeout Time: 09:58          Block Type:   Block Type:   Interscalene  Laterality:  Left  Monitoring:  Continuous pulse ox, standard ASA monitoring, responsive to questions, oxygen, frequent vital sign checks and heart rate  Injection Technique:  Single shot  Procedures: ultrasound guided, paresthesia technique and nerve stimulator    Patient Position: supine  Prep: chlorhexidine    Location:  Interscalene  Needle Type:  Amber  Needle Gauge:  20 G  Needle Localization:  Nerve stimulator, infiltration, anatomical landmarks and ultrasound guidance  Motor Response comment:   Motor Response: minimal motor response >0.4 mA   Medication Injected:  FentaNYL citrate (PF) injection sedation initial, 100 mcg  ropivacaine (PF) (NAROPIN)(0.5%) 5 mg/mL injection, 18 mL  dexamethasone (DECADRON) 4 mg/mL injection, 4 mg  Med Admin Time: 3/15/2021 10:06 AM    Assessment:  Number of attempts:  1  Injection Assessment:  Incremental injection every 5 mL, negative aspiration for CSF, no paresthesia, ultrasound image on chart, no intravascular symptoms, negative aspiration for blood and local visualized surrounding nerve on ultrasound  Patient tolerance:  Patient tolerated the procedure well with no immediate complications

## 2021-03-15 NOTE — ANESTHESIA PREPROCEDURE EVALUATION
Relevant Problems   No relevant active problems       Anesthetic History   No history of anesthetic complications            Review of Systems / Medical History  Patient summary reviewed, nursing notes reviewed and pertinent labs reviewed    Pulmonary  Within defined limits                 Neuro/Psych         Psychiatric history     Cardiovascular              Hyperlipidemia         GI/Hepatic/Renal           PUD     Endo/Other    Diabetes: type 2  Hypothyroidism  Arthritis     Other Findings   Comments: nfection:   COVID-19 (Rule Out)  Allergies  Bee Pollen, Cheese, Stings (Sting, Bee), Adhesive  Ht: -  Weight: -  BMI: 32.57 kg/m²  Procedure  LEFT REVERSE SHOULDER ARTHROPLASTY (URGENT) (BLOCK) (Left )      Medical History  Diabetes (HCC)  High cholesterol  Gastric ulcer  Shoulder pain  Psychiatric disorder  Thyroid disease           Physical Exam    Airway  Mallampati: II  TM Distance: 4 - 6 cm  Neck ROM: normal range of motion   Mouth opening: Normal     Cardiovascular    Rhythm: regular  Rate: normal         Dental  No notable dental hx       Pulmonary  Breath sounds clear to auscultation               Abdominal  GI exam deferred       Other Findings   Comments: Results for Lorena Drew (MRN [id number]) as of 3/15/2021 10:12    3/11/2021 13:25  WBC: 5.3  NRBC: 0.0  RBC: 3.85  HGB: 9.0 (L)  HCT: 30.2 (L)  MCV: 78.4 (L)  MCH: 23.4 (L)  MCHC: 29.8 (L)  RDW: 20.6 (H)  PLATELET: 159  MPV: 9.9  ABSOLUTE NRBC: 0.00    Results for Lorena Drew (MRN [id number]) as of 3/15/2021 10:12    3/11/2021 13:25  Sodium: 139  Potassium: 4.6  Chloride: 105  CO2: 32  Anion gap: 2 (L)  Glucose: 114 (H)  BUN: 16  Creatinine: 0.71  BUN/Creatinine ratio: 23 (H)  Calcium: 9.7  GFR est non-AA: >60  GFR est AA: >60           Anesthetic Plan    ASA: 3  Anesthesia type: general and regional - interscalene block          Induction: Intravenous  Anesthetic plan and risks discussed with: Patient

## 2021-03-16 VITALS
TEMPERATURE: 98.2 F | RESPIRATION RATE: 16 BRPM | OXYGEN SATURATION: 96 % | DIASTOLIC BLOOD PRESSURE: 53 MMHG | SYSTOLIC BLOOD PRESSURE: 133 MMHG | HEART RATE: 52 BPM

## 2021-03-16 LAB
EST. AVERAGE GLUCOSE BLD GHB EST-MCNC: 137 MG/DL
GLUCOSE BLD STRIP.AUTO-MCNC: 141 MG/DL (ref 65–100)
GLUCOSE BLD STRIP.AUTO-MCNC: 55 MG/DL (ref 65–100)
GLUCOSE BLD STRIP.AUTO-MCNC: 93 MG/DL (ref 65–100)
HBA1C MFR BLD: 6.4 % (ref 4–5.6)
HGB BLD-MCNC: 7.8 G/DL (ref 11.5–16)
PERFORMED BY, TECHID: ABNORMAL
PERFORMED BY, TECHID: ABNORMAL
PERFORMED BY, TECHID: NORMAL

## 2021-03-16 PROCEDURE — 74011250637 HC RX REV CODE- 250/637: Performed by: PHYSICIAN ASSISTANT

## 2021-03-16 PROCEDURE — 97116 GAIT TRAINING THERAPY: CPT

## 2021-03-16 PROCEDURE — 97530 THERAPEUTIC ACTIVITIES: CPT

## 2021-03-16 PROCEDURE — 74011250636 HC RX REV CODE- 250/636: Performed by: PHYSICIAN ASSISTANT

## 2021-03-16 PROCEDURE — 82962 GLUCOSE BLOOD TEST: CPT

## 2021-03-16 PROCEDURE — 85018 HEMOGLOBIN: CPT

## 2021-03-16 PROCEDURE — 97165 OT EVAL LOW COMPLEX 30 MIN: CPT

## 2021-03-16 PROCEDURE — 74011250637 HC RX REV CODE- 250/637: Performed by: ORTHOPAEDIC SURGERY

## 2021-03-16 PROCEDURE — 83036 HEMOGLOBIN GLYCOSYLATED A1C: CPT

## 2021-03-16 PROCEDURE — 36415 COLL VENOUS BLD VENIPUNCTURE: CPT

## 2021-03-16 PROCEDURE — 74011636637 HC RX REV CODE- 636/637: Performed by: NURSE PRACTITIONER

## 2021-03-16 PROCEDURE — 97161 PT EVAL LOW COMPLEX 20 MIN: CPT

## 2021-03-16 RX ORDER — ACETAMINOPHEN 500 MG
1000 TABLET ORAL EVERY 6 HOURS
Status: DISCONTINUED | OUTPATIENT
Start: 2021-03-16 | End: 2021-03-16 | Stop reason: HOSPADM

## 2021-03-16 RX ORDER — OXYCODONE HYDROCHLORIDE 5 MG/1
5-10 TABLET ORAL
Qty: 15 TAB | Refills: 0 | Status: SHIPPED | OUTPATIENT
Start: 2021-03-16 | End: 2021-03-19

## 2021-03-16 RX ORDER — KETOROLAC TROMETHAMINE 30 MG/ML
15 INJECTION, SOLUTION INTRAMUSCULAR; INTRAVENOUS EVERY 6 HOURS
Status: DISCONTINUED | OUTPATIENT
Start: 2021-03-16 | End: 2021-03-16 | Stop reason: HOSPADM

## 2021-03-16 RX ORDER — ACETAMINOPHEN 500 MG
1000 TABLET ORAL
Qty: 30 TAB | Refills: 0 | Status: SHIPPED
Start: 2021-03-16

## 2021-03-16 RX ORDER — KETOROLAC TROMETHAMINE 30 MG/ML
15 INJECTION, SOLUTION INTRAMUSCULAR; INTRAVENOUS
Status: COMPLETED | OUTPATIENT
Start: 2021-03-16 | End: 2021-03-16

## 2021-03-16 RX ORDER — OXYCODONE HYDROCHLORIDE 5 MG/1
5 TABLET ORAL
Status: DISCONTINUED | OUTPATIENT
Start: 2021-03-16 | End: 2021-03-16 | Stop reason: HOSPADM

## 2021-03-16 RX ORDER — INSULIN LISPRO 100 [IU]/ML
INJECTION, SOLUTION INTRAVENOUS; SUBCUTANEOUS
Status: DISCONTINUED | OUTPATIENT
Start: 2021-03-16 | End: 2021-03-16 | Stop reason: HOSPADM

## 2021-03-16 RX ORDER — MORPHINE SULFATE 2 MG/ML
1 INJECTION, SOLUTION INTRAMUSCULAR; INTRAVENOUS ONCE
Status: COMPLETED | OUTPATIENT
Start: 2021-03-16 | End: 2021-03-16

## 2021-03-16 RX ORDER — DEXTROSE 50 % IN WATER (D50W) INTRAVENOUS SYRINGE
25-50 AS NEEDED
Status: DISCONTINUED | OUTPATIENT
Start: 2021-03-16 | End: 2021-03-16 | Stop reason: HOSPADM

## 2021-03-16 RX ORDER — AMOXICILLIN 250 MG
1 CAPSULE ORAL
Qty: 20 TAB | Refills: 0 | Status: SHIPPED | OUTPATIENT
Start: 2021-03-16

## 2021-03-16 RX ORDER — ASPIRIN 325 MG
325 TABLET, DELAYED RELEASE (ENTERIC COATED) ORAL DAILY
Qty: 14 TAB | Refills: 0 | Status: SHIPPED | OUTPATIENT
Start: 2021-03-16 | End: 2021-03-30

## 2021-03-16 RX ORDER — DICLOFENAC SODIUM 50 MG/1
50 TABLET, DELAYED RELEASE ORAL 2 TIMES DAILY
Qty: 10 TAB | Refills: 0 | Status: SHIPPED | OUTPATIENT
Start: 2021-03-16 | End: 2021-03-21

## 2021-03-16 RX ORDER — MELATONIN
1000 DAILY
Qty: 30 TAB | Refills: 0 | Status: SHIPPED
Start: 2021-03-17

## 2021-03-16 RX ORDER — OXYCODONE HYDROCHLORIDE 10 MG/1
10 TABLET ORAL
Status: DISCONTINUED | OUTPATIENT
Start: 2021-03-16 | End: 2021-03-16 | Stop reason: HOSPADM

## 2021-03-16 RX ORDER — MAGNESIUM SULFATE 100 %
4 CRYSTALS MISCELLANEOUS AS NEEDED
Status: DISCONTINUED | OUTPATIENT
Start: 2021-03-16 | End: 2021-03-16 | Stop reason: HOSPADM

## 2021-03-16 RX ADMIN — DOCUSATE SODIUM 50MG AND SENNOSIDES 8.6MG 1 TABLET: 8.6; 5 TABLET, FILM COATED ORAL at 08:21

## 2021-03-16 RX ADMIN — OXYCODONE HYDROCHLORIDE 10 MG: 10 TABLET ORAL at 12:03

## 2021-03-16 RX ADMIN — MORPHINE SULFATE 1 MG: 2 INJECTION, SOLUTION INTRAMUSCULAR; INTRAVENOUS at 12:52

## 2021-03-16 RX ADMIN — Medication 1000 UNITS: at 08:21

## 2021-03-16 RX ADMIN — OXYCODONE HYDROCHLORIDE 10 MG: 10 TABLET ORAL at 16:04

## 2021-03-16 RX ADMIN — ACETAMINOPHEN 1000 MG: 500 TABLET ORAL at 14:10

## 2021-03-16 RX ADMIN — OXYCODONE HYDROCHLORIDE 5 MG: 5 TABLET ORAL at 08:20

## 2021-03-16 RX ADMIN — LEVOTHYROXINE SODIUM 125 MCG: 0.03 TABLET ORAL at 06:16

## 2021-03-16 RX ADMIN — Medication 10 ML: at 14:10

## 2021-03-16 RX ADMIN — GLIMEPIRIDE 1 MG: 1 TABLET ORAL at 06:16

## 2021-03-16 RX ADMIN — INSULIN LISPRO 2 UNITS: 100 INJECTION, SOLUTION INTRAVENOUS; SUBCUTANEOUS at 06:32

## 2021-03-16 RX ADMIN — KETOROLAC TROMETHAMINE 15 MG: 30 INJECTION, SOLUTION INTRAMUSCULAR at 10:28

## 2021-03-16 RX ADMIN — Medication 10 ML: at 06:17

## 2021-03-16 RX ADMIN — FAMOTIDINE 20 MG: 20 TABLET ORAL at 08:20

## 2021-03-16 RX ADMIN — ASPIRIN 325 MG: 325 TABLET, COATED ORAL at 08:21

## 2021-03-16 RX ADMIN — LISINOPRIL 5 MG: 5 TABLET ORAL at 08:22

## 2021-03-16 RX ADMIN — KETOROLAC TROMETHAMINE 15 MG: 30 INJECTION, SOLUTION INTRAMUSCULAR at 14:10

## 2021-03-16 RX ADMIN — OXYCODONE HYDROCHLORIDE 5 MG: 5 TABLET ORAL at 02:25

## 2021-03-16 RX ADMIN — ACETAMINOPHEN 650 MG: 325 TABLET, FILM COATED ORAL at 08:20

## 2021-03-16 RX ADMIN — TRAMADOL HYDROCHLORIDE 50 MG: 50 TABLET, FILM COATED ORAL at 05:40

## 2021-03-16 NOTE — PROGRESS NOTES
Orthopedic progress note    Date:3/16/2021       Room:[facility]  Patient Kenrick Martini     YOB: 1950     Age:70 y.o. Subjective    49-year-old female status post reverse left total shoulder arthroplasty secondary to a proximal humerus fracture. Postop day #1. Patient complaining of moderate pain this morning. She does not feel her pain medication is helping as much. No other complaints at this time.   Objective           Vitals Last 24 Hours:  TEMPERATURE:  Temp  Av °F (36.7 °C)  Min: 96 °F (35.6 °C)  Max: 99.3 °F (37.4 °C)  RESPIRATIONS RANGE: Resp  Av.6  Min: 14  Max: 25  PULSE OXIMETRY RANGE: SpO2  Av.9 %  Min: 93 %  Max: 100 %  PULSE RANGE: Pulse  Av.7  Min: 50  Max: 85  BLOOD PRESSURE RANGE: Systolic (73AOI), UID:755 , Min:110 , HLQ:032   ; Diastolic (69IDH), BRO:31, Min:49, Max:74    Current Facility-Administered Medications   Medication Dose Route Frequency    glucose chewable tablet 16 g  4 Tab Oral PRN    dextrose (D50W) injection syrg 12.5-25 g  25-50 mL IntraVENous PRN    glucagon (GLUCAGEN) injection 1 mg  1 mg IntraMUSCular PRN    insulin lispro (HUMALOG) injection   SubCUTAneous AC&HS    sodium chloride (NS) flush 5-40 mL  5-40 mL IntraVENous Q8H    sodium chloride (NS) flush 5-40 mL  5-40 mL IntraVENous PRN    aspirin delayed-release tablet 325 mg  325 mg Oral BID    acetaminophen (TYLENOL) tablet 650 mg  650 mg Oral Q6H PRN    traMADoL (ULTRAM) tablet 50 mg  50 mg Oral Q6H PRN    oxyCODONE IR (ROXICODONE) tablet 5 mg  5 mg Oral Q4H PRN    naloxone (NARCAN) injection 0.4 mg  0.4 mg IntraVENous PRN    ondansetron (ZOFRAN) injection 4 mg  4 mg IntraVENous Q4H PRN    senna-docusate (PERICOLACE) 8.6-50 mg per tablet 1 Tab  1 Tab Oral BID    [START ON 3/17/2021] bisacodyL (DULCOLAX) suppository 10 mg  10 mg Rectal DAILY PRN    0.45% sodium chloride infusion  75 mL/hr IntraVENous CONTINUOUS    ARIPiprazole (ABILIFY) tablet 20 mg  20 mg Oral DAILY    atorvastatin (LIPITOR) tablet 40 mg  40 mg Oral QHS    busPIRone (BUSPAR) tablet 15 mg  15 mg Oral DAILY    citalopram (CELEXA) tablet 20 mg  20 mg Oral DAILY    glimepiride (AMARYL) tablet 1 mg  1 mg Oral ACB    levothyroxine (SYNTHROID) tablet 125 mcg  125 mcg Oral 6am    lisinopriL (PRINIVIL, ZESTRIL) tablet 5 mg  5 mg Oral DAILY    cholecalciferol (VITAMIN D3) (1000 Units /25 mcg) tablet 1,000 Units  1,000 Units Oral DAILY    famotidine (PEPCID) tablet 20 mg  20 mg Oral BID    pioglitazone (ACTOS) tablet 45 mg  45 mg Oral DAILY    insulin glargine (LANTUS) injection 7 Units  7 Units SubCUTAneous QHS      Review of Systems   Constitutional: Negative for malaise/fatigue. Respiratory: Negative for cough, shortness of breath and wheezing. Cardiovascular: Negative for chest pain and palpitations. Gastrointestinal: Negative for abdominal pain, heartburn, nausea and vomiting. Neurological: Negative for headaches. Musculoskeletal: Denies any numbness/tingling of operative extremity    I/O (24Hr): Intake/Output Summary (Last 24 hours) at 3/16/2021 0906  Last data filed at 3/15/2021 1422  Gross per 24 hour   Intake 700 ml   Output 200 ml   Net 500 ml     Objective  Labs/Imaging/Diagnostics    Labs:  CBC:  Recent Labs     03/16/21  0325   HGB 7.8*     CHEMISTRIES:No results for input(s): NA, K, CL, CO2, BUN, CREA, CA, PHOS, MG in the last 72 hours. No lab exists for component: GLUCOSEPT/INR:No results for input(s): INR, INREXT in the last 72 hours. No lab exists for component: PROTIME  APTT:No results for input(s): APTT in the last 72 hours. LIVER PROFILE:No results for input(s): AST, ALT in the last 72 hours. No lab exists for component: BILIDIR, BILITOT, ALKPHOS  No results found for: ALT, AST, GGT, GGTP, AP, APIT, APX, CBIL, TBIL, TBILI    Physical Exam:  Left upper extremity: Aquacel Ag dressing clean dry and intact. Shoulder sling in place.   Minimal swelling seen in the left upper extremity. Sensation intact throughout left upper extremity. Radial pulses palpable. EPL intact.  strength is 5 out of 5. Cap refill less than 2 seconds. Left upper extremity appears neurovascularly intact. Assessment//Plan           Patient Active Problem List    Diagnosis Date Noted    Closed fracture of left proximal humerus 03/15/2021     Status post left reverse total shoulder arthroplasty secondary to proximal humerus fracture. Postop day #1. I will adjust her pain medication accordingly. Plan to discharge home today.     Electronically signed by Bruno Fisher PA-C on 3/16/2021 at 9:06 AM

## 2021-03-16 NOTE — PROGRESS NOTES
OT eval order received and acknowledged. OT eval attempted at 11:34 am however pt in increased pain, declining participation with therapy at this time. Will continue to follow patient and attempt OT eval at a later time. Thank you.

## 2021-03-16 NOTE — PROGRESS NOTES
Writer met with patient at the bedside to discuss home health. Patient states she lives in [place], [place] but is staying with her son temporarily in [place] [address]. Patient plans to discharge to her son's house:  [address] [facility] [address]    Agreeable to home health, no preference. CM to send referrals.

## 2021-03-16 NOTE — PROGRESS NOTES
Problem: Mobility Impaired (Adult and Pediatric)  Goal: *Acute Goals and Plan of Care (Insert Text)  Description: I with LE HEP x7 days  Mod I with bed mob and transfers x7 days  Amb 75-100ft with SBAx1 and LRAD without LOB x 7 days    Outcome: Not Met    PHYSICAL THERAPY EVALUATION  Patient: Reta Jarrett (48 y.o. female)  Date: 3/16/2021  Primary Diagnosis: Closed fracture of left proximal humerus [S42.202A]  Procedure(s) (LRB):  LEFT REVERSE SHOULDER ARTHROPLASTY (URGENT) (BLOCK) (Left) 1 Day Post-Op   Precautions: NWB L UE       ASSESSMENT  63yo F s/p fall at home with fx and now s/p L reverse total shoulder replacement presents to PT with decreased bed mob, transfers, LE strength, gt, activity tolerance, and overall functional mobility. Pt lives with partner in 1 story home with 0 steps to enter home. PTA pt reports I with ADLs and amb without AD. PMH listed below. Currently, pt is CGA with supine to sit EOB, CGA for sit to stand and pt was able to amb approx 40ft with CGAx1 without AD. No LOB during amb, pt did c/o 9/10 pain in L UE, pt amb with UE in sling. Pt may benefit from skilled PT to address her functional deficits. Recommend HHPT upon d/c at this time. PLAN :  Recommendations and Planned Interventions: bed mobility training, transfer training, gait training, therapeutic exercises, patient and family training/education, and therapeutic activities      Frequency/Duration: Patient will be followed by physical therapy:  3 times a week to address goals.     Recommendation for discharge: (in order for the patient to meet his/her long term goals)  HHPT           SUBJECTIVE:   Patient supine in bed upon PT arrival, agreeable to work with PT    OBJECTIVE DATA SUMMARY:   HISTORY:    Past Medical History:   Diagnosis Date    Diabetes ([address]     Gastric ulcer     High cholesterol     Psychiatric disorder     anxiwty and depresion    Shoulder pain     pt states fell 1 week ago , pain left shoulder since    Thyroid disease      Past Surgical History:   Procedure Laterality Date    HX APPENDECTOMY      HX CERVICAL FUSION      X 2    HX GASTRIC BYPASS      gastric bypass 2002    HX LAP CHOLECYSTECTOMY         Personal factors and/or comorbidities impacting plan of care:     Home Situation  Home Environment: Private residence  # Steps to Enter: 0  One/Two Story Residence: One story  Living Alone: No  Support Systems: Spouse/Significant Other/Partner  Patient Expects to be Discharged to[de-identified] Private residence        EXAMINATION/PRESENTATION/DECISION MAKING:   Critical Behavior:  Neurologic State: Alert  Orientation Level: Oriented X4  Cognition: Appropriate decision making           Range Of Motion:  AROM: Within functional limits(B LE)  See OT eval for UE assessment    Strength:    Strength: Generally decreased, functional(B LE)  Grossly 4/5    Tone & Sensation:   Sensation intact to LT B LE      Functional Mobility:  Bed Mobility:     Supine to Sit: Contact guard assistance     Scooting: Contact guard assistance  Transfers:  Sit to Stand: Contact guard assistance  Stand to Sit: Contact guard assistance  Stand Pivot Transfers: Contact guard assistance          Ambulation/Gait Training:  Distance (ft): 40 Feet (ft)  Assistive Device: Gait belt  Ambulation - Level of Assistance: Contact guard assistance              Left Side Weight Bearing: Non-weight bearing(L UE)        Speed/Precious: Slow        Functional Measure:  [facility] [address] [address] AM-PAC 6 Clicks         Basic Mobility Inpatient Short Form  How much difficulty does the patient currently have. .. Unable A Lot A Little None   1. Turning over in bed (including adjusting bedclothes, sheets and blankets)? [] 1   [] 2   [x] 3   [] 4   2. Sitting down on and standing up from a chair with arms ( e.g., wheelchair, bedside commode, etc.)   [] 1   [] 2   [x] 3   [] 4   3. Moving from lying on back to sitting on the side of the bed?    [] 1   [] 2   [x] 3 [] 4          How much help from another person does the patient currently need. .. Total A Lot A Little None   4. Moving to and from a bed to a chair (including a wheelchair)? [] 1   [] 2   [x] 3   [] 4   5. Need to walk in hospital room? [] 1   [] 2   [x] 3   [] 4   6. Climbing 3-5 steps with a railing? [] 1   [] 2   [x] 3   [] 4   © , Trustees of [facility], under license to [employer]. All rights reserved     Score:  Initial: 18 Most Recent: X (Date: -- )   Interpretation of Tool:  Represents activities that are increasingly more difficult (i.e. Bed mobility, Transfers, Gait). Score 24 23 22-20 19-15 14-10 9-7 6   Modifier CH CI CJ CK CL CM CN           Physical Therapy Evaluation Charge Determination   History Examination Presentation Decision-Making   MEDIUM  Complexity : 1-2 comorbidities / personal factors will impact the outcome/ POC  MEDIUM Complexity : 3 Standardized tests and measures addressing body structure, function, activity limitation and / or participation in recreation  LOW Complexity : Stable, uncomplicated  Other outcome measures [facility]  MEDIUM      Based on the above components, the patient evaluation is determined to be of the following complexity level: LOW     Pain Ratin/10    Activity Tolerance:   Fair  Please refer to the flowsheet for vital signs taken during this treatment. After treatment patient left in no apparent distress:   Sitting in chair and Call bell within reach        COMMUNICATION/EDUCATION:   The patients plan of care was discussed with: Occupational therapist and Registered nurse. Fall prevention education was provided and the patient/caregiver indicated understanding. and Patient/family have participated as able in goal setting and plan of care.     Thank you for this referral.  Brendan Schneider   Time Calculation: 18 mins

## 2021-03-16 NOTE — ROUTINE PROCESS
Notified Nicole Tran [place], that pt is still stating her pain is \"excrutiating. \" He states he will order another dose of toradol. Will continue to monitor.

## 2021-03-16 NOTE — ROUTINE PROCESS
[facility] BEDSIDE_[facility]BAL shift change report given to Mario Kirkpatrick RN (oncoming nurse) by Lacie Johnson RN (offgoing nurse). Report included the following information from the SBAR and night assessment

## 2021-03-16 NOTE — PROGRESS NOTES
Writer has informed Alire[facility] that patient's insurance may be a barrier to finding home health. CM will continue to send referrals. Addendum  Writer has also informed therapy services there is no accepting home health. Patient may need to go to outpatient therapy.

## 2021-03-16 NOTE — ROUTINE PROCESS
Notified CARLENE lopez, that pt is still stating her pain is a 9/10 and that nothing has helped her pain yet. He states he will order morphine. Will continue to monitor.

## 2021-03-16 NOTE — ROUTINE PROCESS
Discharge instructions given and explained to pt. Pt verbalized understanding. Pt agreeable to going home self care with prescription for outpatient physical/occupational therapy. IV removed, pt tolerated well. Bilateral knee high EVENS hose applied, extra pair provided and education provided on EVENS hose use, verbalized understanding. Education provided on dressing and sling, pt verbalized understanding. Ice pack provided to pt to take home, education provided on ice pack use. Pt's son here to take pt home. Pt taken downstairs in wheelchair for discharge by Gary Kirkpatrick [facility].

## 2021-03-16 NOTE — PROGRESS NOTES
OCCUPATIONAL THERAPY EVALUATION  Patient: Travis Lennon (19 y.o. female)  Date: 3/16/2021  Primary Diagnosis: Closed fracture of left proximal humerus [S42.202A]  Procedure(s) (LRB):  LEFT REVERSE SHOULDER ARTHROPLASTY (URGENT) (BLOCK) (Left) 1 Day Post-Op   Precautions: NWB LUE, PROM forward flexion to 90 degrees, 15 degrees internal rotation, no external rotation    ASSESSMENT  Patient is a 65year old female, who came to the hospital and underwent reverse L total shoulder arthroplasty 2/2 proximal humerua fracture performed by Dr. Chandana Moore on 3/15/2021. PMH includes: diabetes, gastric ulcer, high cholesterol, anxiety and depression, shoulder pain, thyroid disease, gastric bypass surgery. Based on the objective data described below, the patient presents with increased pain at rest and with mobility, poor activity tolerance, limited PROM on L shoulder, intact seated and standing balance, increased need for A with self care and functional mobility/transfers. Patient semi supine in bed upon OT arrival and agreeable to working with therapy. Patient A&O x4 and per pt report, pt going to live with son upon discharge in a single story house with wheelchair ramp at entrance. Patient was ambulating with no AD, no reported DME at home. Patient CGA sup -> sit, SBA scooting and sit -> sup, SBA sit <> stand, SBA bed <> chair transfer. Patient total A LE dressing, total A UE dressing to don/doff sling. Patient able to tolerate ~5 degrees of passive forward flexion, pain increasing with any mobility and declining further UE therex at this time. Patient educated on allowed PROM and verbalized understanding. Per ortho PA, allowed PROM of 90 degrees forward flexion, 15 degrees internal rotation, 0 degrees external rotation. Patient attempted teach back but requiring cueing as stating allowed external rotation incorrectly. Patient educated on UE dressing techniques but declined participation at this time due to pain.  Patient would benefit from continued skilled OT services to address above deficits and improve safety and independence with self care and functional mobility/transfers. Recommend discharge to home with HHOT and family care when medically appropriate.    Current Level of Function Impacting Discharge (ADLs/self-care): total A LE and UE dressing.    Other factors to consider for discharge: time since onset, pain limiting function        PLAN :  Recommendations and Planned Interventions: self care training, functional mobility training, therapeutic exercise, balance training, therapeutic activities, endurance activities, patient education, home safety training and family training/education    Frequency/Duration: Patient will be followed by occupational therapy 5 times a week to address goals.    Recommendation for discharge: (in order for the patient to meet his/her long term goals)  HHOT and family care    This discharge recommendation:  Has been made in collaboration with the attending provider and/or case management    IF patient discharges home will need the following DME: none       SUBJECTIVE:   Patient stated “I'll live with my son, he works, but I'll have someone with me most of the time.”    OBJECTIVE DATA SUMMARY:   HISTORY:   Past Medical History:   Diagnosis Date   • Diabetes (HCC)    • Gastric ulcer    • High cholesterol    • Psychiatric disorder     anxiwty and depresion   • Shoulder pain     pt states fell 1 week ago , pain left shoulder since   • Thyroid disease      Past Surgical History:   Procedure Laterality Date   • HX APPENDECTOMY     • HX CERVICAL FUSION      X 2   • HX GASTRIC BYPASS      gastric bypass 2002   • HX LAP CHOLECYSTECTOMY         Expanded or extensive additional review of patient history:     Home Situation  Home Environment: Private residence  # Steps to Enter: 0  Wheelchair Ramp: Yes  One/Two Story Residence: One story  Living Alone: No  Support Systems: Child(berny), Spouse/Significant  Other/Partner  Patient Expects to be Discharged to[de-identified] Private residence    PLOF: Pt I for ADLS/IADLS, I with mobility prior to admission. Hand dominance: Right    EXAMINATION OF PERFORMANCE DEFICITS:  Cognitive/Behavioral Status:  Neurologic State: Drowsy  Orientation Level: Oriented X4  Cognition: Appropriate decision making; Follows commands    Skin: bandage and sling on LUE    Edema: none noted    Hearing: Auditory  Auditory Impairment: None    Vision/Perceptual:    No deficits noted    Range of Motion:  AROM: Grossly decreased, non-functional(PROM only on LUE)    Strength:  Strength: Grossly decreased, non-functional(PROM only on LUE)     RUE Strength  Observation: PROM only     LUE Strength  Observation: grossly observed to be 4+/5    Coordination:  Generally intact    Tone & Sensation:  Tone: Normal    Balance:  Sitting: Intact  Standing: Intact    Functional Mobility and Transfers for ADLs:  Bed Mobility:  Supine to Sit: Contact guard assistance  Sit to Supine: Stand-by assistance  Scooting: Stand-by assistance    Transfers:  Sit to Stand: Stand-by assistance  Stand to Sit: Stand-by assistance  Bed to Chair: Stand-by assistance    ADL Assessment:    Upper Body Dressing: Total assistance(don/doff sling)    Lower Body Dressing: Total assistance    ADL Intervention and task modifications:    Upper Body Dressing Assistance  Dressing Assistance: Total assistance(dependent)  Orthotics(Brace): Total assistance (dependent)    Lower Body Dressing Assistance  Dressing Assistance: Total assistance(dependent)  Socks:  Total assistance (dependent)  Position Performed: Seated edge of bed    Therapeutic Exercise:  Patient participated in ~5 degrees forward flexion PROM on LUE, unable to tolerate more at this time due to pain     Functional Measure:    [address] [facility] [address] [address] AM-PACTM \"6 Clicks\"                                                       Daily Activity Inpatient Short Form  How much help from another person does the patient currently need. .. Total; A Lot A Little None   1. Putting on and taking off regular lower body clothing? [x]  1 []  2 []  3 []  4   2. Bathing (including washing, rinsing, drying)? []  1 [x]  2 []  3 []  4   3. Toileting, which includes using toilet, bedpan or urinal? [] 1 [x]  2 []  3 []  4   4. Putting on and taking off regular upper body clothing? [x]  1 []  2 []  3 []  4   5. Taking care of personal grooming such as brushing teeth? []  1 []  2 [x]  3 []  4   6. Eating meals? []  1 []  2 [x]  3 []  4   © , Trustees of [address] [place] [address] [address] [address], under license to [employer]. All rights reserved     Score:      Interpretation of Tool:  Represents clinically-significant functional categories (i.e. Activities of daily living). Percentage of Impairment CH    0%   CI    1-19% CJ    20-39% CK    40-59% CL    60-79% CM    80-99% CN     100%   [facility]  Score 6-24 24 23 20-22 15-19 10-14 7-9 6        Occupational Therapy Evaluation Charge Determination   History Examination Decision-Making   LOW Complexity : Brief history review  MEDIUM Complexity : 3-5 performance deficits relating to physical, cognitive , or psychosocial skils that result in activity limitations and / or participation restrictions LOW Complexity : No comorbidities that affect functional and no verbal or physical assistance needed to complete eval tasks       Based on the above components, the patient evaluation is determined to be of the following complexity level: LOW     Pain Ratin/10 at rest, 10/10 with any mobility    Activity Tolerance:   Poor and pain limiting participation    After treatment patient left in no apparent distress:    Supine in bed, Call bell within reach, Bed / chair alarm activated and Side rails x 3    COMMUNICATION/EDUCATION:   The patients plan of care was discussed with: Physical therapist, Registered nurse and ortho PA.      Home safety education was provided and the patient/caregiver indicated understanding., Patient/family have participated as able in goal setting and plan of care. and Patient/family agree to work toward stated goals and plan of care. This patients plan of care is appropriate for delegation to [facility].     Problem: Self Care Deficits Care Plan (Adult)  Goal: *Acute Goals and Plan of Care (Insert Text)  Description: Pt will be mod I sup <> sit in prep for EOB ADLs  Pt will be mod I UE dressing sitting EOB including don/doff sling and don loose pullover shirt  Pt will be mod I grooming sitting EOB/in chair  Pt will be mod A LE dressing sitting EOB/long sit  Pt will be mod I sit <>  prep for toileting LRAD  Pt will be mod I toileting/toilet transfer/cloth mgmt LRAD  Pt will be I following UE HEP in prep for self care tasks     Outcome: Not Met     Thank you for this referral.  Levy Warren, OTR/L  Time Calculation: 23 mins

## 2021-03-18 NOTE — BRIEF OP NOTE
Operative Note    Patient: Olvin Billings  YOB: 1950  MRN: [id number]    Date of Procedure: 3/15/2021     Pre-Op Diagnosis: CLOSED FRACTURE PROXIMAL HUMERUS Left, THREE PART      Post-Op Diagnosis: CLOSED FRACTURE PROXIMAL HUMERUS Left, THREE PART    Procedure(s):  1. LEFT REVERSE SHOULDER ARTHROPLASTY    2. With open reduction internal fixation of the greater and lesser tuberosities ,  3. Autologous bone grafting from the humeral head    4. Open biceps tenodesis   5. Modifier 22 is appropriate for this procedure as the operative field was altered by the comminuted proximal humerus fracture  and a prior  open rotator cuff repair. This required extra operative time and required cementing of the humeral stem. Additionally, 3 non metallic rotator cuff anchors along with nonabsorbable sutures were removed from the greater tuberosity, which is not a routine part of reverse shoulder arthroplasty. The fractured greater and lesser tuberosities have the attachment of the rotator cuff,  So these were  Open reduced and reconstructed at the appropriate height using several FiberWire and Ethibond suture through drill holes in the humerus shaft as well as holes in the humerus epiphysis. Surgeon(s):  Mal Lara MD    Surgical Assistant: Surg Asst-1: Jose Villa    Anesthesia: General     Estimated Blood Loss (mL): 94DB    Complications: none    Specimens: * No specimens in log *      indications:  Patient is a 66-year-old pleasant lady who sustained the above-mentioned fracture after a fall. I discussed with her risks benefits alternatives of the above-mentioned procedure. She was in agreement. Informed consent was obtained. Procedure in detail:   On day of surgery, patient was identified marked and informed consent was verified. She received an interscalene nerve block from Anesthesia in the preoperative holding area.   She was taken to the operating room and was given successful general endotracheal anesthesia and placed in a semi-beach chair position with all bony prominences padded. left upper extremity was prepped and draped in routine sterile fashion. IV clindamycin and IV tranexamic acid was given. Time-out was performed. Anterior incision extending from the coracoid process laterally in the deltopectoral interval was made. The deltopectoral interval was opened. Cephalic vein was protected. Shoulder was abducted. Subdeltoid bursa was dissected. The coracoid process and the conjoined tendon was identified  And retracted. The biceps tendon was identified. It was noted to be trapped in the fracture site. The tendon was tenotomized superior to the pectoralis major insertion. It was tenodesed to the  Superior edge of the pectoralis major tendon using 2. Ethibond sutures. the rotator cuff interval was opened. The lesser and greater tuberosities were retracted. The humeral head came out in 2 separate fragments. the glenoid was exposed. The labrum was excised. The base of the coracoid was identified and the center of the glenoid was identified. A centering guidewire was placed. Glenoid cartilage  Was reamed to exposed bleeding bone. Reaming was done for the central peg of the Metaglene. Standard metaglene was press fitted and fixation augmented with 3 locking and 1 nonlocking screw,  each in 1 quadrant. A standard 38 mm glenoid sphere was then locked on to the Metaglene completing the glenoid side reconstruction. The reconstruction of the proximal humerus was complicated by comminuted fracture involving the greater and lesser tuberosities,   as well as the  scarring resulting from an open rotator cuff repair in this region. The greater and lesser tuberosities were clearly identified. The intra-articular rotator cuff anchors had been exposed by the fracture. Three of these anchors were removed along with nonabsorbable sutures.  The rotator cuff tendons anteriorly superiorly and posteriorly were tagged with 2. FiberWire sutures close to the tendon bone interface. The tuberosities where trimmed  to fit around the planned humeral epiphysis. the bicipital groove was identified for rotational landmark. The humerus medullary canal was reamed to a size 12. There was inadequate intact bone proximally to provide rotational stability to our trial component. So decision to cement was made. Four drill holes were made in the proximal humerus about 2 cm distal to the fracture line and  FiberWire sutures were placed for subsequent tuberosity reconstruction      Cement was mixed with vancomycin. And intramedullary cement restrictor was placed. The canal was prepared washed and dried. Cement was applied in the intramedullary canal.  Size 12 humeral stem was then placed with a size 1 epiphysis. The stem was cemented referenced using the forearm as the rotational landmark (0 degree from foreram)  with height referenced from the pectoralis major insertion and the fracture tuberositites. After the cement was set,,  Excess cement was removed and the humeral stem was noted to be stable. Based on trialing  for range of motion stability and soft tissue tension,  I elected to use the +9 metal spacer and a +3 polyethylene cup on the humerus side. Secure locking of these components were checked. the greater and lesser tuberosities were then reconstructed around the proximal prosthetic humerus using were vertical and horizontal repair with multiple Ethibond and FiberWire sutures. Autologous cancellous graft was obtained from the extracted humerus head. This was then implanted around the tuberosities to enhance tuberosity healing. The wound was thoroughly lavaged and dried. Hemostasis was secured. The deltopectoral interval was loosely approximated with 3 interrupted 1. Vicryl sutures. The subcutaneous tissue was closed with 0 strata fix. Skin was closed with staples. Sterile dry dressing was applied. Patient was awakened extubated and shifted to PACU in a stable condition and a shoulder immobilizer was applied. Implants:   Implant Name Type Inv.  Item Serial No.  Lot No. LRB No. Used Action   COMPONENT MYRON FIX METAGLENE GLOB UNITE PLATFRM SHLDR SYS - [id number]  COMPONENT MYRON FIX METAGLENE GLOB UNITE PLATFRM SHLDR SYS [id number] [facility] [id number] Left 1 Implanted   SCREW BNE L36MM DIA4.5MM MYRON SHLDR METAGLENE GINETTE FOR DELT - SNA  SCREW BNE L36MM DIA4.5MM MYRON SHLDR METAGLENE GINETTE FOR DELT NA [facility] [id number] Left 1 Implanted   SCREW BNE L30MM DIA4.5MM MYRON SHLDR METAGLENE GINETTE FOR DELT - SNA  SCREW BNE L30MM DIA4.5MM MYRON SHLDR METAGLENE GINETTE FOR DELT [facility] [id number] Left 1 Implanted   COMPONENT GLENOSPHERE XTEND 38MM PLUS 0MM SHORT POST - SNA  COMPONENT GLENOSPHERE XTEND 38MM PLUS 0MM SHORT POST NA [facility] [id number] Left 1 Implanted   SCREW BNE L24MM DIA4.5MM MYRON SHLDR METAGLENE GINETTE FOR DELT - SNA  SCREW BNE L24MM DIA4.5MM MYRON SHLDR METAGLENE GINETTE FOR DELT NA [facility] [id number] Left 1 Implanted   SCREW BNE L18MM DIA4.5MM SHLDR TI NONLOCKING FULL THRD FOR - SNA  SCREW BNE L18MM DIA4.5MM SHLDR TI NONLOCKING FULL THRD FOR NA [facility] [id number] Left 1 Implanted   COMPONENT HUM REV FRAC EPIPHYSIS CNTR - SNA [employer] COMPONENT HUM REV FRAC EPIPHYSIS CNTR NA [facility] [id number] Left 1 Implanted   STEM HUM L121MM [id number] STD SHLDR PORCOAT FOR PLATFRM - SNA [employer]iva STEM HUM L121MM [id number] STD SHLDR PORCOAT FOR PLATFRM [facility] [id number] Left 1 Implanted   CEMENT BNE 20ML 40GM FULL DOSE PMMA W/O ANTIBIO M VISC - SNA Cement CEMENT BNE 20ML 40GM FULL DOSE PMMA W/O ANTIBIO M VISC NA [employer] [id number] [id number] Left 2 Implanted   CUP HUM [id number] +3MM OFFSET STD SHLDR POLYETH DELT XTEND - SNA  CUP HUM [id number] +3MM OFFSET STD SHLDR POLYETH DELT XTEND NA [facility] [employer] [id number] Left 1 Implanted   SPACER HUM +9MM OFFSET SHLDR POLYETH FOR DELT XTEND REV SYS - SNA  SPACER HUM +9MM OFFSET SHLDR POLYETH FOR DELT XTEND REV SYS NA [facility] [employer]PAD[employer] [facility] [id number] Left 1 Implanted   SCREW BNE L24MM DIA4.5MM MYRON SHLDR METAGLENE GINETTE FOR DELT - SNA Screw SCREW BNE L24MM DIA4.5MM MYRON SHLDR METAGLENE GINETTE FOR DELT NA [facility] nanoMR [facility] [id number] Left 1 Implanted   SHOULDER S3 TOT ADV REVERSE IMPL CAPPED S3 - [id number]  SHOULDER S3 TOT ADV REVERSE IMPL CAPPED S3  [facility] DEPUY [facility]  Left 1 Implanted       Drains: * No LDAs found *    Findings: displaced   Comminuted proximal humerus fracture with fractured lesser tuberosity,  Evidence of a prior rotator cuff repair with fracture extending into the greater tuberosity,   Three  Intra osseous Non metallic rotator cuff anchors  Were removed as they had become exposed in the fracture site    Electronically Signed by Todd Kinney MD on 3/17/2021 at 8:43 PM

## 2021-03-24 LAB — SARS-COV-2, COV2NT: NOT DETECTED

## 2021-03-24 NOTE — PROGRESS NOTES
Made discharge follow up call back to patient today 3/24/2021 @ 2251. Patient stated that the medication they sent her home with is not really helping with the pain and feels she needs something different. She is also having trouble with her left leg swelling and becoming painful, I informed her she needs to call the Dr's office to see if she can get an earlier appointment because it might be something more serious. Patient has no further issues or questions at this time.

## 2022-03-20 PROBLEM — S42.202A CLOSED FRACTURE OF LEFT PROXIMAL HUMERUS: Status: ACTIVE | Noted: 2021-03-15

## 2023-05-15 RX ORDER — LEVOTHYROXINE SODIUM 0.12 MG/1
125 TABLET ORAL
COMMUNITY

## 2023-05-15 RX ORDER — GLIMEPIRIDE 1 MG/1
1 TABLET ORAL
COMMUNITY

## 2023-05-15 RX ORDER — AMOXICILLIN 250 MG
1 CAPSULE ORAL 2 TIMES DAILY PRN
COMMUNITY
Start: 2021-03-16

## 2023-05-15 RX ORDER — ARIPIPRAZOLE 20 MG/1
20 TABLET ORAL DAILY
COMMUNITY

## 2023-05-15 RX ORDER — ATORVASTATIN CALCIUM 40 MG/1
40 TABLET, FILM COATED ORAL DAILY
COMMUNITY

## 2023-05-15 RX ORDER — CITALOPRAM 20 MG/1
20 TABLET ORAL DAILY
COMMUNITY

## 2023-05-15 RX ORDER — PANTOPRAZOLE SODIUM 40 MG/1
40 TABLET, DELAYED RELEASE ORAL DAILY
COMMUNITY

## 2023-05-15 RX ORDER — PIOGLITAZONEHYDROCHLORIDE 45 MG/1
45 TABLET ORAL DAILY
COMMUNITY

## 2023-05-15 RX ORDER — BUSPIRONE HYDROCHLORIDE 15 MG/1
15 TABLET ORAL DAILY
COMMUNITY

## 2023-05-15 RX ORDER — LISINOPRIL 5 MG/1
5 TABLET ORAL DAILY
COMMUNITY

## 2023-05-15 RX ORDER — ACETAMINOPHEN 500 MG
1000 TABLET ORAL EVERY 8 HOURS PRN
COMMUNITY
Start: 2021-03-16

## (undated) DEVICE — 3M™ TEGADERM™ +PAD FILM DRESSING WITH NON-ADHERENT PAD, 3591, 3-1/2 IN X 10 IN (9 CM X 25 CM), 25/CAR, 4 CAR/CS: Brand: 3M™ TEGADERM™

## (undated) DEVICE — SUT VCRL + 1 36IN CT VIO --

## (undated) DEVICE — 3M™ IOBAN™ 2 ANTIMICROBIAL INCISE DRAPE 6651EZ: Brand: IOBAN™ 2

## (undated) DEVICE — CEMENT MIXING SYSTEM WITH FEMORAL BREAKWAY NOZZLE: Brand: REVOLUTION

## (undated) DEVICE — STERILE POLYISOPRENE POWDER-FREE SURGICAL GLOVES WITH EMOLLIENT COATING: Brand: PROTEXIS

## (undated) DEVICE — 3M™ STERI-DRAPE™ U-DRAPE 1067 1067 5/BX 4BX/CS/CTN&#X20;: Brand: STERI-DRAPE™

## (undated) DEVICE — 1010 S-DRAPE TOWEL DRAPE 10/BX: Brand: STERI-DRAPE™

## (undated) DEVICE — Device

## (undated) DEVICE — SKIN CLOS DERMABND PRINEO 60CM -- DERMABOUND PRINEO

## (undated) DEVICE — PENCIL ES 2200DEG HI TEMP CUTERY FN TIP BTTRY OPERATED

## (undated) DEVICE — INTENDED FOR TISSUE SEPARATION, AND OTHER PROCEDURES THAT REQUIRE A SHARP SURGICAL BLADE TO PUNCTURE OR CUT.: Brand: BARD-PARKER SAFETY BLADES SIZE 10, STERILE

## (undated) DEVICE — SOL IRR NACL 0.9% 500ML POUR --

## (undated) DEVICE — SUTURE VCRL 2-0 TIE 54IN ABSRB BRAID UD J607H

## (undated) DEVICE — SUTURE ETHBND EXCEL SZ 2 L30IN NONABSORBABLE GRN L40MM V-37 MX69G

## (undated) DEVICE — HEWSON SUTURE RETRIEVER: Brand: HEWSON SUTURE RETRIEVER

## (undated) DEVICE — INTENDED FOR TISSUE SEPARATION, AND OTHER PROCEDURES THAT REQUIRE A SHARP SURGICAL BLADE TO PUNCTURE OR CUT.: Brand: BARD-PARKER SAFETY BLADES SIZE 15, STERILE

## (undated) DEVICE — GOWN,SIRUS,FABRNF,XL,20/CS: Brand: MEDLINE

## (undated) DEVICE — GUIDEPIN ORTH L190MM DIA2.5MM METAGLENE CTRL FOR DELT XTEND

## (undated) DEVICE — PLASMABLADE PS300-004 SUCT COAG BLA 16PK: Brand: PLASMABLADE™

## (undated) DEVICE — FRAZIER SUCTION INSTRUMENT 3 FR W/CONTROL VENT & OBTURATOR: Brand: FRAZIER

## (undated) DEVICE — STAPLER SKIN H3.9MM WIRE DIA0.58MM CRWN 6.9MM 35 STPL FIX

## (undated) DEVICE — SOL IRR SOD CL 0.9% 3000ML --

## (undated) DEVICE — REAMER SURG SZ 40/44MM CTRL LO PROF GLOB APG+

## (undated) DEVICE — STAPLER SKIN H3.9MM WIRE DIA0.58MM CRWN 6.9MM 35 STPL ROT

## (undated) DEVICE — TUBING SUCTION UNIV 3/16 INX20 FT W/ SCALLOPED CONN STRL

## (undated) DEVICE — 3M™ STERI-STRIP™ REINFORCED ADHESIVE SKIN CLOSURES, R1547, 1/2 IN X 4 IN (12 MM X 100 MM), 6 STRIPS/ENVELOPE: Brand: 3M™ STERI-STRIP™

## (undated) DEVICE — T-MAX DISPOSABLE FACE MASK 8 PER BOX

## (undated) DEVICE — PREP SKN CHLRAPRP APL 26ML STR --

## (undated) DEVICE — DRAIN SURG L18IN DIA1IN 100% SIL RADPQ FOR CLS WND DRNAGE

## (undated) DEVICE — KIT BNE CEM PREP PLUG BRSH CURET SPNG W/ RESTRIC FOR FEM

## (undated) DEVICE — PAD,ABDOMINAL,8"X7.5",STERILE,LF,1/PK: Brand: MEDLINE

## (undated) DEVICE — YANKAUER,BULB TIP,W/O VENT,RIGID,STERILE: Brand: MEDLINE

## (undated) DEVICE — SUTURE STRATAFIX SPRL PDO 0 DBL ARMED MH MH 9 IN LEN SWEDGED SXPD2B410

## (undated) DEVICE — SUTURE FIBERWIRE SZ 5 L38IN NONABSORBABLE BLU L48MM 1/2 AR7211

## (undated) DEVICE — SUT VCRL + 2-0 36IN CT1 UD --

## (undated) DEVICE — TURNOVER KIT OR CUST CMB FLD GEN LF

## (undated) DEVICE — SUTURE FIBERWIRE SZ 2 W/ TAPERED NEEDLE BLUE L38IN NONABSORB BLU L26.5MM 1/2 CIRCLE AR7200

## (undated) DEVICE — 3M™ STERI-DRAPE™ INSTRUMENT POUCH 1018L: Brand: STERI-DRAPE™

## (undated) DEVICE — NDL SINE QNCKE 22GAX1.5IN BLK --

## (undated) DEVICE — BASIC SINGLE BASIN-LF: Brand: MEDLINE INDUSTRIES, INC.

## (undated) DEVICE — STERILE POLYISOPRENE POWDER-FREE SURGICAL GLOVES: Brand: PROTEXIS

## (undated) DEVICE — SUTURE MCRYL SZ 3-0 L27IN ABSRB UD L24MM PS-1 3/8 CIR PRIM Y936H